# Patient Record
Sex: FEMALE | Race: WHITE | NOT HISPANIC OR LATINO
[De-identification: names, ages, dates, MRNs, and addresses within clinical notes are randomized per-mention and may not be internally consistent; named-entity substitution may affect disease eponyms.]

---

## 2017-08-01 ENCOUNTER — APPOINTMENT (OUTPATIENT)
Dept: MAMMOGRAPHY | Facility: CLINIC | Age: 62
End: 2017-08-01
Payer: MEDICAID

## 2017-08-01 ENCOUNTER — RESULT REVIEW (OUTPATIENT)
Age: 62
End: 2017-08-01

## 2017-08-01 ENCOUNTER — OUTPATIENT (OUTPATIENT)
Dept: OUTPATIENT SERVICES | Facility: HOSPITAL | Age: 62
LOS: 1 days | End: 2017-08-01
Payer: MEDICAID

## 2017-08-01 DIAGNOSIS — Z00.8 ENCOUNTER FOR OTHER GENERAL EXAMINATION: ICD-10-CM

## 2017-08-01 PROCEDURE — 77063 BREAST TOMOSYNTHESIS BI: CPT | Mod: 26

## 2017-08-01 PROCEDURE — 77063 BREAST TOMOSYNTHESIS BI: CPT

## 2017-08-01 PROCEDURE — G0202: CPT | Mod: 26

## 2017-08-01 PROCEDURE — 77067 SCR MAMMO BI INCL CAD: CPT

## 2017-08-07 ENCOUNTER — INPATIENT (INPATIENT)
Facility: HOSPITAL | Age: 62
LOS: 9 days | Discharge: HOME | End: 2017-08-17
Attending: INTERNAL MEDICINE | Admitting: INTERNAL MEDICINE
Payer: MEDICAID

## 2017-08-07 VITALS — WEIGHT: 149.91 LBS | HEIGHT: 63 IN

## 2017-08-07 LAB
ALBUMIN SERPL ELPH-MCNC: 3.3 G/DL — SIGNIFICANT CHANGE UP (ref 3.3–5)
ALP SERPL-CCNC: 83 U/L — SIGNIFICANT CHANGE UP (ref 40–120)
ALT FLD-CCNC: 182 U/L — HIGH (ref 12–78)
ANION GAP SERPL CALC-SCNC: 10 MMOL/L — SIGNIFICANT CHANGE UP (ref 5–17)
APPEARANCE UR: CLEAR — SIGNIFICANT CHANGE UP
APTT BLD: 29.6 SEC — SIGNIFICANT CHANGE UP (ref 27.5–37.4)
AST SERPL-CCNC: 129 U/L — HIGH (ref 15–37)
BACTERIA # UR AUTO: (no result)
BASOPHILS # BLD AUTO: 0.1 K/UL — SIGNIFICANT CHANGE UP (ref 0–0.2)
BASOPHILS NFR BLD AUTO: 0.9 % — SIGNIFICANT CHANGE UP (ref 0–2)
BILIRUB SERPL-MCNC: 1.9 MG/DL — HIGH (ref 0.2–1.2)
BILIRUB UR-MCNC: (no result)
BUN SERPL-MCNC: 10 MG/DL — SIGNIFICANT CHANGE UP (ref 7–23)
CALCIUM SERPL-MCNC: 9.5 MG/DL — SIGNIFICANT CHANGE UP (ref 8.5–10.1)
CHLORIDE SERPL-SCNC: 97 MMOL/L — SIGNIFICANT CHANGE UP (ref 96–108)
CK SERPL-CCNC: 56 U/L — SIGNIFICANT CHANGE UP (ref 26–192)
CO2 SERPL-SCNC: 27 MMOL/L — SIGNIFICANT CHANGE UP (ref 22–31)
COLOR SPEC: (no result)
CREAT SERPL-MCNC: 0.65 MG/DL — SIGNIFICANT CHANGE UP (ref 0.5–1.3)
DIFF PNL FLD: (no result)
EOSINOPHIL # BLD AUTO: 0.2 K/UL — SIGNIFICANT CHANGE UP (ref 0–0.5)
EOSINOPHIL NFR BLD AUTO: 2.5 % — SIGNIFICANT CHANGE UP (ref 0–6)
EPI CELLS # UR: SIGNIFICANT CHANGE UP
GLUCOSE SERPL-MCNC: 135 MG/DL — HIGH (ref 70–99)
GLUCOSE UR QL: NEGATIVE MG/DL — SIGNIFICANT CHANGE UP
HCT VFR BLD CALC: 43.4 % — SIGNIFICANT CHANGE UP (ref 34.5–45)
HGB BLD-MCNC: 14.7 G/DL — SIGNIFICANT CHANGE UP (ref 11.5–15.5)
INR BLD: 1.14 RATIO — SIGNIFICANT CHANGE UP (ref 0.88–1.16)
KETONES UR-MCNC: (no result)
LEUKOCYTE ESTERASE UR-ACNC: (no result)
LIDOCAIN IGE QN: 6621 U/L — HIGH (ref 73–393)
LYMPHOCYTES # BLD AUTO: 1 K/UL — SIGNIFICANT CHANGE UP (ref 1–3.3)
LYMPHOCYTES # BLD AUTO: 17 % — SIGNIFICANT CHANGE UP (ref 13–44)
MCHC RBC-ENTMCNC: 33.3 PG — SIGNIFICANT CHANGE UP (ref 27–34)
MCHC RBC-ENTMCNC: 33.9 GM/DL — SIGNIFICANT CHANGE UP (ref 32–36)
MCV RBC AUTO: 98.2 FL — SIGNIFICANT CHANGE UP (ref 80–100)
MONOCYTES # BLD AUTO: 0.5 K/UL — SIGNIFICANT CHANGE UP (ref 0–0.9)
MONOCYTES NFR BLD AUTO: 8.6 % — SIGNIFICANT CHANGE UP (ref 2–14)
NEUTROPHILS # BLD AUTO: 4.3 K/UL — SIGNIFICANT CHANGE UP (ref 1.8–7.4)
NEUTROPHILS NFR BLD AUTO: 71.1 % — SIGNIFICANT CHANGE UP (ref 43–77)
NITRITE UR-MCNC: NEGATIVE — SIGNIFICANT CHANGE UP
PH UR: 6.5 — SIGNIFICANT CHANGE UP (ref 5–8)
PLATELET # BLD AUTO: 206 K/UL — SIGNIFICANT CHANGE UP (ref 150–400)
POTASSIUM SERPL-MCNC: 3.6 MMOL/L — SIGNIFICANT CHANGE UP (ref 3.5–5.3)
POTASSIUM SERPL-SCNC: 3.6 MMOL/L — SIGNIFICANT CHANGE UP (ref 3.5–5.3)
PROT SERPL-MCNC: 7.8 GM/DL — SIGNIFICANT CHANGE UP (ref 6–8.3)
PROT UR-MCNC: 30 MG/DL
PROTHROM AB SERPL-ACNC: 12.3 SEC — SIGNIFICANT CHANGE UP (ref 9.8–12.7)
RBC # BLD: 4.42 M/UL — SIGNIFICANT CHANGE UP (ref 3.8–5.2)
RBC # FLD: 11.3 % — SIGNIFICANT CHANGE UP (ref 10.3–14.5)
RBC CASTS # UR COMP ASSIST: SIGNIFICANT CHANGE UP /HPF (ref 0–4)
SODIUM SERPL-SCNC: 134 MMOL/L — LOW (ref 135–145)
SP GR SPEC: 1.01 — SIGNIFICANT CHANGE UP (ref 1.01–1.02)
TROPONIN I SERPL-MCNC: <0.015 NG/ML — SIGNIFICANT CHANGE UP (ref 0.01–0.04)
UROBILINOGEN FLD QL: 4 MG/DL
WBC # BLD: 6.1 K/UL — SIGNIFICANT CHANGE UP (ref 3.8–10.5)
WBC # FLD AUTO: 6.1 K/UL — SIGNIFICANT CHANGE UP (ref 3.8–10.5)
WBC UR QL: SIGNIFICANT CHANGE UP

## 2017-08-07 PROCEDURE — 93010 ELECTROCARDIOGRAM REPORT: CPT

## 2017-08-07 PROCEDURE — 74177 CT ABD & PELVIS W/CONTRAST: CPT | Mod: 26

## 2017-08-07 PROCEDURE — 76705 ECHO EXAM OF ABDOMEN: CPT | Mod: 26

## 2017-08-07 PROCEDURE — 71010: CPT | Mod: 26

## 2017-08-07 PROCEDURE — 99285 EMERGENCY DEPT VISIT HI MDM: CPT

## 2017-08-07 RX ORDER — ONDANSETRON 8 MG/1
4 TABLET, FILM COATED ORAL EVERY 6 HOURS
Qty: 0 | Refills: 0 | Status: DISCONTINUED | OUTPATIENT
Start: 2017-08-07 | End: 2017-08-17

## 2017-08-07 RX ORDER — FAMOTIDINE 10 MG/ML
20 INJECTION INTRAVENOUS ONCE
Qty: 0 | Refills: 0 | Status: COMPLETED | OUTPATIENT
Start: 2017-08-07 | End: 2017-08-07

## 2017-08-07 RX ORDER — HYDROMORPHONE HYDROCHLORIDE 2 MG/ML
1 INJECTION INTRAMUSCULAR; INTRAVENOUS; SUBCUTANEOUS EVERY 4 HOURS
Qty: 0 | Refills: 0 | Status: DISCONTINUED | OUTPATIENT
Start: 2017-08-07 | End: 2017-08-14

## 2017-08-07 RX ORDER — SODIUM CHLORIDE 9 MG/ML
1000 INJECTION INTRAMUSCULAR; INTRAVENOUS; SUBCUTANEOUS ONCE
Qty: 0 | Refills: 0 | Status: COMPLETED | OUTPATIENT
Start: 2017-08-07 | End: 2017-08-07

## 2017-08-07 RX ORDER — SODIUM CHLORIDE 9 MG/ML
1000 INJECTION INTRAMUSCULAR; INTRAVENOUS; SUBCUTANEOUS
Qty: 0 | Refills: 0 | Status: DISCONTINUED | OUTPATIENT
Start: 2017-08-07 | End: 2017-08-11

## 2017-08-07 RX ORDER — SODIUM CHLORIDE 9 MG/ML
1000 INJECTION, SOLUTION INTRAVENOUS
Qty: 0 | Refills: 0 | Status: DISCONTINUED | OUTPATIENT
Start: 2017-08-07 | End: 2017-08-10

## 2017-08-07 RX ORDER — PANTOPRAZOLE SODIUM 20 MG/1
40 TABLET, DELAYED RELEASE ORAL DAILY
Qty: 0 | Refills: 0 | Status: DISCONTINUED | OUTPATIENT
Start: 2017-08-07 | End: 2017-08-16

## 2017-08-07 RX ORDER — MAGNESIUM SULFATE 500 MG/ML
1 VIAL (ML) INJECTION ONCE
Qty: 0 | Refills: 0 | Status: COMPLETED | OUTPATIENT
Start: 2017-08-07 | End: 2017-08-07

## 2017-08-07 RX ORDER — HYDROMORPHONE HYDROCHLORIDE 2 MG/ML
0.5 INJECTION INTRAMUSCULAR; INTRAVENOUS; SUBCUTANEOUS EVERY 4 HOURS
Qty: 0 | Refills: 0 | Status: DISCONTINUED | OUTPATIENT
Start: 2017-08-07 | End: 2017-08-07

## 2017-08-07 RX ORDER — ONDANSETRON 8 MG/1
4 TABLET, FILM COATED ORAL ONCE
Qty: 0 | Refills: 0 | Status: COMPLETED | OUTPATIENT
Start: 2017-08-07 | End: 2017-08-07

## 2017-08-07 RX ORDER — SODIUM CHLORIDE 9 MG/ML
3 INJECTION INTRAMUSCULAR; INTRAVENOUS; SUBCUTANEOUS ONCE
Qty: 0 | Refills: 0 | Status: COMPLETED | OUTPATIENT
Start: 2017-08-07 | End: 2017-08-07

## 2017-08-07 RX ORDER — HYDROMORPHONE HYDROCHLORIDE 2 MG/ML
1 INJECTION INTRAMUSCULAR; INTRAVENOUS; SUBCUTANEOUS ONCE
Qty: 0 | Refills: 0 | Status: DISCONTINUED | OUTPATIENT
Start: 2017-08-07 | End: 2017-08-07

## 2017-08-07 RX ORDER — ENOXAPARIN SODIUM 100 MG/ML
40 INJECTION SUBCUTANEOUS EVERY 24 HOURS
Qty: 0 | Refills: 0 | Status: DISCONTINUED | OUTPATIENT
Start: 2017-08-07 | End: 2017-08-17

## 2017-08-07 RX ADMIN — HYDROMORPHONE HYDROCHLORIDE 1 MILLIGRAM(S): 2 INJECTION INTRAMUSCULAR; INTRAVENOUS; SUBCUTANEOUS at 11:03

## 2017-08-07 RX ADMIN — Medication 100 GRAM(S): at 21:21

## 2017-08-07 RX ADMIN — SODIUM CHLORIDE 1000 MILLILITER(S): 9 INJECTION INTRAMUSCULAR; INTRAVENOUS; SUBCUTANEOUS at 08:45

## 2017-08-07 RX ADMIN — ENOXAPARIN SODIUM 40 MILLIGRAM(S): 100 INJECTION SUBCUTANEOUS at 21:25

## 2017-08-07 RX ADMIN — HYDROMORPHONE HYDROCHLORIDE 1 MILLIGRAM(S): 2 INJECTION INTRAMUSCULAR; INTRAVENOUS; SUBCUTANEOUS at 11:30

## 2017-08-07 RX ADMIN — Medication 1 MILLIGRAM(S): at 15:00

## 2017-08-07 RX ADMIN — SODIUM CHLORIDE 3 MILLILITER(S): 9 INJECTION INTRAMUSCULAR; INTRAVENOUS; SUBCUTANEOUS at 08:45

## 2017-08-07 RX ADMIN — FAMOTIDINE 20 MILLIGRAM(S): 10 INJECTION INTRAVENOUS at 08:46

## 2017-08-07 RX ADMIN — ONDANSETRON 4 MILLIGRAM(S): 8 TABLET, FILM COATED ORAL at 09:30

## 2017-08-07 RX ADMIN — HYDROMORPHONE HYDROCHLORIDE 1 MILLIGRAM(S): 2 INJECTION INTRAMUSCULAR; INTRAVENOUS; SUBCUTANEOUS at 15:22

## 2017-08-07 RX ADMIN — HYDROMORPHONE HYDROCHLORIDE 1 MILLIGRAM(S): 2 INJECTION INTRAMUSCULAR; INTRAVENOUS; SUBCUTANEOUS at 19:57

## 2017-08-07 RX ADMIN — SODIUM CHLORIDE 125 MILLILITER(S): 9 INJECTION, SOLUTION INTRAVENOUS at 15:22

## 2017-08-07 RX ADMIN — PANTOPRAZOLE SODIUM 40 MILLIGRAM(S): 20 TABLET, DELAYED RELEASE ORAL at 21:21

## 2017-08-07 NOTE — H&P ADULT - HISTORY OF PRESENT ILLNESS
62F with Hx of PUD, Etoh dependence and Pancreatitis in the past p/w abd pain x5 days, worse this weekend. pt states abd pain is mostly in the upper abd, worse in the left side. denies any vomiting, diarrhea. (+) nausea. no fever, chills, dysuria. pain much worse this weekend. pt usually drinks 2-3 vodka drinks daily, last weekend had more to drink however. last drink saturday night. denies any hx of seizure in the past, no hx of detox prior.

## 2017-08-07 NOTE — ED PROVIDER NOTE - MEDICAL DECISION MAKING DETAILS
61 yo WF, h/o PUD & pancreatitis, w/ upper abd. pain which became more generalized.  + Focal RUQ/epigastric tender.  Plan: NPO, IVF, IV Pepcid, labs, RUQ abd. sono, EKG, CXR.  Observe, reassess, medicate as needed. 61 yo WF, h/o PUD & pancreatitis, w/ upper abd. pain which became more generalized.  + Focal RUQ/epigastric tender.  Plan: NPO, IVF, IV Pepcid, labs, RUQ abd. sono, EKG, CXR.  Observe, reassess, medicate as needed.  Lipase 6600, sono no arsen., + mildly prominent panc. duct.  CT A/P ordered, pt TBA.

## 2017-08-07 NOTE — H&P ADULT - ASSESSMENT
62F with Etoh induced pancreatitis      *Pancreatitis:  -admit to medicine  -npo, ivf  -gi eval  -trend LRTs, monitor for dilutional effects  -pain-control, anti-emetics    *Etoh Dependence and withdrawal:  -currently mild w/d, CIWA 4  -ativan prn on low risk w/d protocol  -etoh cessation   -IVF hydration  -sz px      *Hx PUD:  -PPI    *DVT px:  -LMWH

## 2017-08-07 NOTE — ED ADULT NURSE NOTE - CHPI ED SYMPTOMS NEG
no blood in stool/no burning urination/no fever/no dysuria/no chills/no vomiting/no diarrhea/no nausea/no hematuria

## 2017-08-07 NOTE — ED PROVIDER NOTE - OBJECTIVE STATEMENT
Exam begun at 08:25.  61 yo WF, PMH pancreatitis ~ 10 yrs. ago, PUD, ambulatory to ED w/  c/o'ing abdominal pain.  Somewhat acute onset 2 dd. ago: mild, but + worsened overnight/ this AM to presently severe.  Pain is dull ache, nonradiating, initially epigastric but which has become more diffuse, + exacerbated by po attempt but not by movement.  + Associated loss appetite, thania[phoresis.  No F/C, urine c/o's, N/V/D, cp, SOB, cough, rash.  Meds: none.  NKDA.  PCP: [facility]

## 2017-08-07 NOTE — ED ADULT NURSE NOTE - OBJECTIVE STATEMENT
Pt presents to ED c/o abd pain that started in upper abdomen but is now diffuse in all 4 quadrants. Pt reports that drinking or eating anything worsens symptoms. Pt reports hx of ulcers and hx of pancreatitis 20 years ago

## 2017-08-07 NOTE — H&P ADULT - NSHPLABSRESULTS_GEN_ALL_CORE
14.7   6.1   )-----------( 206      ( 07 Aug 2017 08:26 )             43.4         134<L>  |  97  |  10  ----------------------------<  135<H>  3.6   |  27  |  0.65    Ca    9.5      07 Aug 2017 08:26    TPro  7.8  /  Alb  3.3  /  TBili  1.9<H>  /  DBili  x   /  AST  129<H>  /  ALT  182<H>  /  AlkPhos  83      CARDIAC MARKERS ( 07 Aug 2017 08:26 )  <0.015 ng/mL / x     / 56 U/L / x     / x          LIVER FUNCTIONS - ( 07 Aug 2017 08:26 )  Alb: 3.3 g/dL / Pro: 7.8 gm/dL / ALK PHOS: 83 U/L / ALT: 182 U/L / AST: 129 U/L / GGT: x           PT/INR - ( 07 Aug 2017 08:26 )   PT: 12.3 sec;   INR: 1.14 ratio         PTT - ( 07 Aug 2017 08:26 )  PTT:29.6 sec  Urinalysis Basic - ( 07 Aug 2017 10:30 )    Color: Jessi / Appearance: Clear / S.010 / pH: x  Gluc: x / Ketone: Large  / Bili: Moderate / Urobili: 4 mg/dL   Blood: x / Protein: 30 mg/dL / Nitrite: Negative   Leuk Esterase: Trace / RBC: 0-2 /HPF / WBC 0-2   Sq Epi: x / Non Sq Epi: Few / Bacteria: Few          Blood, Urine: Trace ( @ 10:30)

## 2017-08-07 NOTE — ED ADULT TRIAGE NOTE - CHIEF COMPLAINT QUOTE
c/o abdominal pain to all four quadrants started yesterday, worse today, denies nausea/vomiting/diarrhea.

## 2017-08-07 NOTE — CONSULT NOTE ADULT - ASSESSMENT
63 yo woman admitted with pancreatitis    -NPO  -IVF  -Pain management  -Elevated liver tests due to ETOH hepatitis. No indication for steroids  -Monitor LFTs, UOP, Hct, BUN

## 2017-08-07 NOTE — CONSULT NOTE ADULT - SUBJECTIVE AND OBJECTIVE BOX
HPI:  61 yo woman was admitted with acute pancreatitis. Drinks heavily. Had an episode of pancreatitis 23 years ago. No n/v. No fever. +epigastric pain.       PAST MEDICAL & SURGICAL HISTORY:  Alcohol dependence  Pancreatitis  PUD (peptic ulcer disease)  No significant past surgical history      MEDICATIONS  (STANDING):  enoxaparin Injectable 40 milliGRAM(s) SubCutaneous every 24 hours  sodium chloride 0.9%. 1000 milliLiter(s) (125 mL/Hr) IV Continuous <Continuous>  sodium chloride 0.9% 1000 milliLiter(s) (125 mL/Hr) IV Continuous <Continuous>  magnesium sulfate  IVPB 1 Gram(s) IV Intermittent once  pantoprazole  Injectable 40 milliGRAM(s) IV Push daily    MEDICATIONS  (PRN):  ondansetron Injectable 4 milliGRAM(s) IV Push every 6 hours PRN Nausea  HYDROmorphone  Injectable 0.5 milliGRAM(s) IV Push every 4 hours PRN Moderate Pain (4 - 6)  HYDROmorphone  Injectable 1 milliGRAM(s) IV Push every 4 hours PRN Severe Pain (7 - 10)  LORazepam     Tablet 2 milliGRAM(s) Oral every 2 hours PRN CIWA-Ar score increase by 2 points and a total score of 7 or less      Allergies    No Known Allergies    Intolerances        SOCIAL HISTORY:  No smoking, social alcohol use, no recreational drug use    FAMILY HISTORY:      REVIEW OF SYSTEMS    General: no fever    HEENT: no icterus    Respiratory and Thorax: no SOB  	  Cardiovascular: no CP    Gastrointestinal: as above    : no dysuria    Musculoskeletal: no myalgias    Skin: no jaundice    Neuro: no headaches or dizziness    Vital Signs Last 24 Hrs  T(C): 36.8 (07 Aug 2017 15:00), Max: 36.9 (07 Aug 2017 11:02)  T(F): 98.2 (07 Aug 2017 15:00), Max: 98.5 (07 Aug 2017 11:02)  HR: 82 (07 Aug 2017 15:00) (82 - 105)  BP: 137/82 (07 Aug 2017 15:00) (137/82 - 161/93)  BP(mean): --  RR: 16 (07 Aug 2017 15:00) (16 - 18)  SpO2: 100% (07 Aug 2017 15:00) (98% - 100%)    PHYSICAL EXAM:    Constitutional: NAD    Head: NCAT    HEENT: anicteric    Neck: no abnormal lymphadenopathy    Respiratory: CTA BL    Cardiovascular:  RRR    Gastrointestinal: soft ND +BS +epigastric tenderness    Extremities: no LE edema    Neuro: no focal deficits    Skin: no jaundice      LABS:                        14.7   6.1   )-----------( 206      ( 07 Aug 2017 08:26 )             43.4     08-07    134<L>  |  97  |  10  ----------------------------<  135<H>  3.6   |  27  |  0.65    Ca    9.5      07 Aug 2017 08:26  Phos  3.3     08-07  Mg     1.5     08-07    TPro  7.8  /  Alb  3.3  /  TBili  1.9<H>  /  DBili  x   /  AST  129<H>  /  ALT  182<H>  /  AlkPhos  83  08-07    PT/INR - ( 07 Aug 2017 08:26 )   PT: 12.3 sec;   INR: 1.14 ratio         PTT - ( 07 Aug 2017 08:26 )  PTT:29.6 sec  LIVER FUNCTIONS - ( 07 Aug 2017 08:26 )  Alb: 3.3 g/dL / Pro: 7.8 gm/dL / ALK PHOS: 83 U/L / ALT: 182 U/L / AST: 129 U/L / GGT: x             RADIOLOGY & ADDITIONAL STUDIES:

## 2017-08-07 NOTE — ED ADULT NURSE REASSESSMENT NOTE - NS ED NURSE REASSESS COMMENT FT1
Pt medicated as order for pain, reports instant partial relief. Pt drinking oral contrast for CT scan. Pt's  at bedside, LUIS MIGUEL

## 2017-08-07 NOTE — ED PROVIDER NOTE - GASTROINTESTINAL, MLM
Abdomen soft, BS hypoactive, normal pitch.  + Slight diffuse tenderness, + moderate TTP RUQ > epigastric w/ + clinical Olmos's sign.

## 2017-08-07 NOTE — H&P ADULT - NSHPPHYSICALEXAM_GEN_ALL_CORE
PHYSICAL EXAM:    Constitutional: NAD, awake and alert, well-developed  HEENT: PERR, EOMI, Normal Hearing, MMM  Neck: Soft and supple, No LAD, No JVD  Respiratory: Breath sounds are clear bilaterally, No wheezing, rales or rhonchi  Cardiovascular: S1 and S2, regular rate and rhythm, no Murmurs, gallops or rubs  Gastrointestinal: Bowel Sounds present, soft, tender in upper abd, L>R, nondistended, no guarding, no rebound  Extremities: No peripheral edema  Vascular: 2+ peripheral pulses  Neurological: A/O x 3, no focal deficits  Musculoskeletal: 5/5 strength b/l upper and lower extremities. No CVA tenderness.  Skin: No rashes

## 2017-08-07 NOTE — ED PROVIDER NOTE - CONSTITUTIONAL, MLM
normal... WF, awake, alert, oriented to person, place, time/situation, mildly ill-appearing, moderate distress due to pain.

## 2017-08-07 NOTE — SBIRT NOTE. - NSSBIRTFULLSCREEN_GEN_A_ED_FT
Meeting with patient attempted however Full Screen Not Performed due to    Severity of illness; patient expressed pain to this author, health  communicated the information to the RN; health  will return to the bedside at 10am

## 2017-08-08 LAB
MAGNESIUM SERPL-MCNC: 1.8 MG/DL — SIGNIFICANT CHANGE UP (ref 1.6–2.6)
PHOSPHATE SERPL-MCNC: 2.1 MG/DL — LOW (ref 2.5–4.5)

## 2017-08-08 RX ORDER — POTASSIUM CHLORIDE 20 MEQ
20 PACKET (EA) ORAL ONCE
Qty: 0 | Refills: 0 | Status: DISCONTINUED | OUTPATIENT
Start: 2017-08-08 | End: 2017-08-08

## 2017-08-08 RX ORDER — POTASSIUM CHLORIDE 20 MEQ
10 PACKET (EA) ORAL
Qty: 0 | Refills: 0 | Status: COMPLETED | OUTPATIENT
Start: 2017-08-08 | End: 2017-08-08

## 2017-08-08 RX ADMIN — HYDROMORPHONE HYDROCHLORIDE 1 MILLIGRAM(S): 2 INJECTION INTRAMUSCULAR; INTRAVENOUS; SUBCUTANEOUS at 09:01

## 2017-08-08 RX ADMIN — HYDROMORPHONE HYDROCHLORIDE 1 MILLIGRAM(S): 2 INJECTION INTRAMUSCULAR; INTRAVENOUS; SUBCUTANEOUS at 00:13

## 2017-08-08 RX ADMIN — HYDROMORPHONE HYDROCHLORIDE 1 MILLIGRAM(S): 2 INJECTION INTRAMUSCULAR; INTRAVENOUS; SUBCUTANEOUS at 04:46

## 2017-08-08 RX ADMIN — HYDROMORPHONE HYDROCHLORIDE 1 MILLIGRAM(S): 2 INJECTION INTRAMUSCULAR; INTRAVENOUS; SUBCUTANEOUS at 14:36

## 2017-08-08 RX ADMIN — SODIUM CHLORIDE 125 MILLILITER(S): 9 INJECTION, SOLUTION INTRAVENOUS at 14:37

## 2017-08-08 RX ADMIN — SODIUM CHLORIDE 125 MILLILITER(S): 9 INJECTION INTRAMUSCULAR; INTRAVENOUS; SUBCUTANEOUS at 09:09

## 2017-08-08 RX ADMIN — SODIUM CHLORIDE 125 MILLILITER(S): 9 INJECTION INTRAMUSCULAR; INTRAVENOUS; SUBCUTANEOUS at 00:55

## 2017-08-08 RX ADMIN — ENOXAPARIN SODIUM 40 MILLIGRAM(S): 100 INJECTION SUBCUTANEOUS at 20:53

## 2017-08-08 RX ADMIN — PANTOPRAZOLE SODIUM 40 MILLIGRAM(S): 20 TABLET, DELAYED RELEASE ORAL at 11:15

## 2017-08-08 RX ADMIN — Medication 100 MILLIEQUIVALENT(S): at 11:15

## 2017-08-08 RX ADMIN — HYDROMORPHONE HYDROCHLORIDE 1 MILLIGRAM(S): 2 INJECTION INTRAMUSCULAR; INTRAVENOUS; SUBCUTANEOUS at 23:00

## 2017-08-08 RX ADMIN — Medication 100 MILLIEQUIVALENT(S): at 12:49

## 2017-08-08 RX ADMIN — HYDROMORPHONE HYDROCHLORIDE 1 MILLIGRAM(S): 2 INJECTION INTRAMUSCULAR; INTRAVENOUS; SUBCUTANEOUS at 18:56

## 2017-08-08 NOTE — PROGRESS NOTE ADULT - SUBJECTIVE AND OBJECTIVE BOX
HPI:  61 yo woman was admitted with acute pancreatitis. Drinks heavily. Had an episode of pancreatitis 23 years ago. No n/v. No fever. +epigastric pain.       PAST MEDICAL & SURGICAL HISTORY:  Alcohol dependence  Pancreatitis  PUD (peptic ulcer disease)  No significant past surgical history    Vital Signs Last 24 Hrs  - noted    Constitutional: NAD    Head: NCAT    HEENT: anicteric    Neck: no abnormal lymphadenopathy    Respiratory: CTA BL    Cardiovascular:  RRR    Gastrointestinal: soft ND +BS +epigastric tenderness    Extremities: no LE edema    Neuro: no focal deficits    Skin: no jaundice      LABS: reviewed                  A/P:     61 yo woman admitted with pancreatitis    - lipase trending downdown  -IVF  -Pain management  -Elevated liver tests due to ETOH hepatitis. No indication for steroids  - outpt f/up pancreatic cyst

## 2017-08-08 NOTE — PROGRESS NOTE ADULT - SUBJECTIVE AND OBJECTIVE BOX
HPI:  61 yo woman was admitted with acute pancreatitis. Drinks heavily. Had an episode of pancreatitis 23 years ago. No n/v. No fever. +epigastric pain but improving.     MEDICATIONS  (STANDING):  enoxaparin Injectable 40 milliGRAM(s) SubCutaneous every 24 hours  sodium chloride 0.9%. 1000 milliLiter(s) (125 mL/Hr) IV Continuous <Continuous>  sodium chloride 0.9% 1000 milliLiter(s) (125 mL/Hr) IV Continuous <Continuous>  pantoprazole  Injectable 40 milliGRAM(s) IV Push daily  potassium chloride  20 mEq/100 mL IVPB 20 milliEquivalent(s) IV Intermittent once    MEDICATIONS  (PRN):  ondansetron Injectable 4 milliGRAM(s) IV Push every 6 hours PRN Nausea  HYDROmorphone  Injectable 0.5 milliGRAM(s) IV Push every 4 hours PRN Moderate Pain (4 - 6)  HYDROmorphone  Injectable 1 milliGRAM(s) IV Push every 4 hours PRN Severe Pain (7 - 10)  LORazepam     Tablet 2 milliGRAM(s) Oral every 2 hours PRN CIWA-Ar score increase by 2 points and a total score of 7 or less      Allergies    No Known Allergies    Intolerances        REVIEW OF SYSTEMS    General: no fever    HEENT: no icterus    Respiratory and Thorax: no SOB  	  Cardiovascular: no CP    Gastrointestinal: as above    Skin: no jaundice      Vital Signs Last 24 Hrs  T(C): 37.8 (08 Aug 2017 09:00), Max: 37.8 (08 Aug 2017 09:00)  T(F): 100 (08 Aug 2017 09:00), Max: 100 (08 Aug 2017 09:00)  HR: 98 (08 Aug 2017 09:00) (82 - 111)  BP: 126/81 (08 Aug 2017 04:05) (120/78 - 161/93)  BP(mean): --  RR: 17 (08 Aug 2017 09:00) (16 - 18)  SpO2: 95% (08 Aug 2017 09:00) (93% - 100%)    PHYSICAL EXAM:    Constitutional: NAD    HEENT: anicteric    Respiratory: CTA BL    Cardiovascular:  RRR    Gastrointestinal: soft ND +BS mild epigastric tenderness    Extremities: no LE edema    Neuro: no focal deficits    Skin: no jaundice      LABS:                        12.0   5.9   )-----------( 155      ( 08 Aug 2017 05:59 )             34.7     08-08    135  |  102  |  10  ----------------------------<  88  3.1<L>   |  24  |  0.42<L>    Ca    7.7<L>      08 Aug 2017 05:59  Phos  2.1     08-08  Mg     1.8     08-08    TPro  5.9<L>  /  Alb  2.5<L>  /  TBili  2.0<H>  /  DBili  x   /  AST  66<H>  /  ALT  97<H>  /  AlkPhos  60  08-08    PT/INR - ( 07 Aug 2017 08:26 )   PT: 12.3 sec;   INR: 1.14 ratio         PTT - ( 07 Aug 2017 08:26 )  PTT:29.6 sec  LIVER FUNCTIONS - ( 08 Aug 2017 05:59 )  Alb: 2.5 g/dL / Pro: 5.9 gm/dL / ALK PHOS: 60 U/L / ALT: 97 U/L / AST: 66 U/L / GGT: x             RADIOLOGY & ADDITIONAL STUDIES:

## 2017-08-08 NOTE — PROGRESS NOTE ADULT - ASSESSMENT
63 yo woman admitted with pancreatitis    -Clear liquids  -Good response to IVF: can decrease rate if tolerating adequate amounts of clears  -Pain management: wean  -Elevated liver tests due to ETOH hepatitis. No indication for steroids  -Monitor LFTs, UOP, Hct, BUN

## 2017-08-09 LAB
ANION GAP SERPL CALC-SCNC: 10 MMOL/L — SIGNIFICANT CHANGE UP (ref 5–17)
BUN SERPL-MCNC: 6 MG/DL — LOW (ref 7–23)
CALCIUM SERPL-MCNC: 7.4 MG/DL — LOW (ref 8.5–10.1)
CHLORIDE SERPL-SCNC: 100 MMOL/L — SIGNIFICANT CHANGE UP (ref 96–108)
CO2 SERPL-SCNC: 25 MMOL/L — SIGNIFICANT CHANGE UP (ref 22–31)
CREAT SERPL-MCNC: 0.46 MG/DL — LOW (ref 0.5–1.3)
GLUCOSE SERPL-MCNC: 83 MG/DL — SIGNIFICANT CHANGE UP (ref 70–99)
LIDOCAIN IGE QN: 591 U/L — HIGH (ref 73–393)
MAGNESIUM SERPL-MCNC: 1.9 MG/DL — SIGNIFICANT CHANGE UP (ref 1.6–2.6)
PHOSPHATE SERPL-MCNC: 1.6 MG/DL — LOW (ref 2.5–4.5)
POTASSIUM SERPL-MCNC: 3.2 MMOL/L — LOW (ref 3.5–5.3)
POTASSIUM SERPL-SCNC: 3.2 MMOL/L — LOW (ref 3.5–5.3)
SODIUM SERPL-SCNC: 135 MMOL/L — SIGNIFICANT CHANGE UP (ref 135–145)

## 2017-08-09 PROCEDURE — 74177 CT ABD & PELVIS W/CONTRAST: CPT | Mod: 26

## 2017-08-09 RX ORDER — ACETAMINOPHEN 500 MG
650 TABLET ORAL EVERY 6 HOURS
Qty: 0 | Refills: 0 | Status: DISCONTINUED | OUTPATIENT
Start: 2017-08-09 | End: 2017-08-17

## 2017-08-09 RX ORDER — POTASSIUM CHLORIDE 20 MEQ
40 PACKET (EA) ORAL ONCE
Qty: 0 | Refills: 0 | Status: COMPLETED | OUTPATIENT
Start: 2017-08-09 | End: 2017-08-09

## 2017-08-09 RX ORDER — ACETAMINOPHEN 500 MG
650 TABLET ORAL ONCE
Qty: 0 | Refills: 0 | Status: COMPLETED | OUTPATIENT
Start: 2017-08-09 | End: 2017-08-09

## 2017-08-09 RX ADMIN — Medication 650 MILLIGRAM(S): at 13:53

## 2017-08-09 RX ADMIN — HYDROMORPHONE HYDROCHLORIDE 1 MILLIGRAM(S): 2 INJECTION INTRAMUSCULAR; INTRAVENOUS; SUBCUTANEOUS at 23:36

## 2017-08-09 RX ADMIN — PANTOPRAZOLE SODIUM 40 MILLIGRAM(S): 20 TABLET, DELAYED RELEASE ORAL at 11:27

## 2017-08-09 RX ADMIN — HYDROMORPHONE HYDROCHLORIDE 1 MILLIGRAM(S): 2 INJECTION INTRAMUSCULAR; INTRAVENOUS; SUBCUTANEOUS at 13:07

## 2017-08-09 RX ADMIN — SODIUM CHLORIDE 125 MILLILITER(S): 9 INJECTION INTRAMUSCULAR; INTRAVENOUS; SUBCUTANEOUS at 01:27

## 2017-08-09 RX ADMIN — HYDROMORPHONE HYDROCHLORIDE 1 MILLIGRAM(S): 2 INJECTION INTRAMUSCULAR; INTRAVENOUS; SUBCUTANEOUS at 07:48

## 2017-08-09 RX ADMIN — Medication 40 MILLIEQUIVALENT(S): at 11:27

## 2017-08-09 RX ADMIN — SODIUM CHLORIDE 125 MILLILITER(S): 9 INJECTION INTRAMUSCULAR; INTRAVENOUS; SUBCUTANEOUS at 21:01

## 2017-08-09 RX ADMIN — ENOXAPARIN SODIUM 40 MILLIGRAM(S): 100 INJECTION SUBCUTANEOUS at 21:01

## 2017-08-09 RX ADMIN — SODIUM CHLORIDE 125 MILLILITER(S): 9 INJECTION, SOLUTION INTRAVENOUS at 07:48

## 2017-08-09 NOTE — PROGRESS NOTE ADULT - SUBJECTIVE AND OBJECTIVE BOX
Patient is a 62y old  Female who presents with a chief complaint of Abd pain (07 Aug 2017 14:42)      HPI:  62F with Hx of PUD, Etoh dependence and Pancreatitis in the past p/w abd pain x5 days, worse this weekend. pt states abd pain is mostly in the upper abd, worse in the left side. denies any vomiting, diarrhea. (+) nausea. no fever, chills, dysuria. pain much worse this weekend. pt usually drinks 2-3 vodka drinks daily, last weekend had more to drink however. last drink saturday night. denies any hx of seizure in the past, no hx of detox prior. (07 Aug 2017 14:42).    Patient was feeling, better, but awoke this AM with increased pain. Tolerating liquids. No nausea.      PAST MEDICAL & SURGICAL HISTORY:  Alcohol dependence  Pancreatitis  PUD (peptic ulcer disease)  No significant past surgical history      MEDICATIONS  (STANDING):  enoxaparin Injectable 40 milliGRAM(s) SubCutaneous every 24 hours  sodium chloride 0.9%. 1000 milliLiter(s) (125 mL/Hr) IV Continuous <Continuous>  sodium chloride 0.9% 1000 milliLiter(s) (125 mL/Hr) IV Continuous <Continuous>  pantoprazole  Injectable 40 milliGRAM(s) IV Push daily  potassium chloride    Tablet ER 40 milliEquivalent(s) Oral once    MEDICATIONS  (PRN):  ondansetron Injectable 4 milliGRAM(s) IV Push every 6 hours PRN Nausea  HYDROmorphone  Injectable 0.5 milliGRAM(s) IV Push every 4 hours PRN Moderate Pain (4 - 6)  HYDROmorphone  Injectable 1 milliGRAM(s) IV Push every 4 hours PRN Severe Pain (7 - 10)  LORazepam     Tablet 2 milliGRAM(s) Oral every 2 hours PRN CIWA-Ar score increase by 2 points and a total score of 7 or less      Allergies    No Known Allergies    Intolerances        REVIEW OF SYSTEMS:    CONSTITUTIONAL: No weakness, fevers or chills  RESPIRATORY: No cough, wheezing, hemoptysis; No shortness of breath  CARDIOVASCULAR: No chest pain or palpitations  GASTROINTESTINAL: No abdominal or epigastric pain. No nausea, vomiting, or hematemesis; No diarrhea or constipation. No melena or hematochezia.  All other review of systems is negative unless indicated above.    Vital Signs Last 24 Hrs  T(C): 37.7 (09 Aug 2017 05:23), Max: 37.7 (09 Aug 2017 05:23)  T(F): 99.8 (09 Aug 2017 05:23), Max: 99.8 (09 Aug 2017 05:23)  HR: 95 (09 Aug 2017 05:23) (95 - 104)  BP: 112/65 (09 Aug 2017 05:23) (109/67 - 124/58)  BP(mean): --  RR: 16 (09 Aug 2017 01:22) (16 - 18)  SpO2: 95% (09 Aug 2017 05:23) (93% - 95%)    PHYSICAL EXAM:    Constitutional: NAD, well-developed  Respiratory: CTAB  Cardiovascular: S1 and S2, RRR, no M/G/R  Gastrointestinal: BS+, soft, mild epigastric tenderness      LABS:                        12.0   5.9   )-----------( 155      ( 08 Aug 2017 05:59 )             34.7     08-09    135  |  100  |  6<L>  ----------------------------<  83  3.2<L>   |  25  |  0.46<L>    Ca    7.4<L>      09 Aug 2017 07:14  Phos  1.6     08-09  Mg     1.9     08-09    TPro  5.9<L>  /  Alb  2.5<L>  /  TBili  2.0<H>  /  DBili  x   /  AST  66<H>  /  ALT  97<H>  /  AlkPhos  60  08-08      LIVER FUNCTIONS - ( 08 Aug 2017 05:59 )  Alb: 2.5 g/dL / Pro: 5.9 gm/dL / ALK PHOS: 60 U/L / ALT: 97 U/L / AST: 66 U/L / GGT: x             RADIOLOGY & ADDITIONAL STUDIES:

## 2017-08-09 NOTE — PROGRESS NOTE ADULT - SUBJECTIVE AND OBJECTIVE BOX
HPI:  63 yo woman was admitted with acute pancreatitis. Drinks heavily. Had an episode of pancreatitis 23 years ago. No n/v. No fever. +epigastric pain.       PAST MEDICAL & SURGICAL HISTORY:  Alcohol dependence  Pancreatitis  PUD (peptic ulcer disease)  No significant past surgical history    Vital Signs Last 24 Hrs  T(C): 37.7 (09 Aug 2017 05:23), Max: 37.8 (08 Aug 2017 09:00)  T(F): 99.8 (09 Aug 2017 05:23), Max: 100 (08 Aug 2017 09:00)  HR: 95 (09 Aug 2017 05:23) (95 - 104)  BP: 112/65 (09 Aug 2017 05:23) (109/67 - 124/58)  BP(mean): --  RR: 16 (09 Aug 2017 01:22) (16 - 18)  SpO2: 95% (09 Aug 2017 05:23) (93% - 95%)  PHYSICAL EXAM:    Constitutional: NAD    Head: NCAT    HEENT: anicteric    Neck: no abnormal lymphadenopathy    Respiratory: CTA BL    Cardiovascular:  RRR    Gastrointestinal: soft ND +BS +epigastric tenderness    Extremities: no LE edema    Neuro: no focal deficits    Skin: no jaundice      LABS: reviewed                  A/P:     63 yo woman admitted with pancreatitis    -NPO  -IVF  -Pain management  -Elevated liver tests due to ETOH hepatitis. No indication for steroids  -Monitor LFTs, UOP, Hct, BUN  - outpt f/up pancreatic cyst HPI:  61 yo woman was admitted with acute pancreatitis. Drinks heavily. Had an episode of pancreatitis 23 years ago. No n/v. No fever. +epigastric pain.       PAST MEDICAL & SURGICAL HISTORY:  Alcohol dependence  Pancreatitis  PUD (peptic ulcer disease)  No significant past surgical history    Vital Signs Last 24 Hrs  T(C): 37.7 (09 Aug 2017 05:23), Max: 37.8 (08 Aug 2017 09:00)  T(F): 99.8 (09 Aug 2017 05:23), Max: 100 (08 Aug 2017 09:00)  HR: 95 (09 Aug 2017 05:23) (95 - 104)  BP: 112/65 (09 Aug 2017 05:23) (109/67 - 124/58)  BP(mean): --  RR: 16 (09 Aug 2017 01:22) (16 - 18)  SpO2: 95% (09 Aug 2017 05:23) (93% - 95%)  PHYSICAL EXAM:    Constitutional: NAD    Head: NCAT    HEENT: anicteric    Neck: no abnormal lymphadenopathy    Respiratory: CTA BL    Cardiovascular:  RRR    Gastrointestinal: soft ND +BS +epigastric tenderness    Extremities: no LE edema    Neuro: no focal deficits    Skin: no jaundice      LABS: reviewed                  A/P:     61 yo woman admitted with pancreatitis    - lipase down to 300  -IVF  -Pain management  -Elevated liver tests due to ETOH hepatitis. No indication for steroids  - outpt f/up pancreatic cyst HPI:  61 yo woman was admitted with acute pancreatitis. Drinks heavily. Had an episode of pancreatitis 23 years ago. No n/v. No fever. +epigastric pain. Tm 100.6; woke up in pain; abd is silent today      PAST MEDICAL & SURGICAL HISTORY:  Alcohol dependence  Pancreatitis  PUD (peptic ulcer disease)  No significant past surgical history    Vital Signs Last 24 Hrs  T(C): 37.7 (09 Aug 2017 05:23), Max: 37.8 (08 Aug 2017 09:00)  T(F): 99.8 (09 Aug 2017 05:23), Max: 100 (08 Aug 2017 09:00)  HR: 95 (09 Aug 2017 05:23) (95 - 104)  BP: 112/65 (09 Aug 2017 05:23) (109/67 - 124/58)  BP(mean): --  RR: 16 (09 Aug 2017 01:22) (16 - 18)  SpO2: 95% (09 Aug 2017 05:23) (93% - 95%)  PHYSICAL EXAM:    Constitutional: NAD    Head: NCAT    HEENT: anicteric    Neck: no abnormal lymphadenopathy    Respiratory: CTA BL    Cardiovascular:  RRR    Gastrointestinal: more distended, decreased BS +epigastric tenderness    Extremities: no LE edema    Neuro: no focal deficits    Skin: no jaundice      LABS: reviewed                  A/P:     61 yo woman admitted with pancreatitis    - lipase down to 300 but in pain and with decreased BS  -IVF; restart NPO; monitor temps  -Pain management  -Elevated liver tests due to ETOH hepatitis. No indication for steroids  - outpt f/up pancreatic cyst  hypokalemia hypomagnesemia hypophso sec to gi loss etoh abuse

## 2017-08-09 NOTE — [facility] QUERY NOTE - NSCDIOTHERTXTBX_GEN_ALL_CORE_HH
As per lab results: K 3.1-3.2, Magnesium 1.5, and Phosphorus 1.6. Magnesium sulfate 1 gram IV ordered, KCl PO and IV ordered. Please clarify the significance of these findings in your progress notes:  A. hypokalemia  B. hypomagnesemia  C. hypophosphatemia  D. all of the above or other

## 2017-08-09 NOTE — PROGRESS NOTE ADULT - ASSESSMENT
63 yo female with pancreatitis. Numbers improving. Would slowly advance diet if tolerated and observe.

## 2017-08-10 LAB
ALBUMIN SERPL ELPH-MCNC: 2.5 G/DL — LOW (ref 3.3–5)
ALP SERPL-CCNC: 80 U/L — SIGNIFICANT CHANGE UP (ref 40–120)
ALT FLD-CCNC: 86 U/L — HIGH (ref 12–78)
ANION GAP SERPL CALC-SCNC: 9 MMOL/L — SIGNIFICANT CHANGE UP (ref 5–17)
AST SERPL-CCNC: 73 U/L — HIGH (ref 15–37)
BILIRUB SERPL-MCNC: 2.5 MG/DL — HIGH (ref 0.2–1.2)
BUN SERPL-MCNC: 4 MG/DL — LOW (ref 7–23)
CALCIUM SERPL-MCNC: 8 MG/DL — LOW (ref 8.5–10.1)
CHLORIDE SERPL-SCNC: 100 MMOL/L — SIGNIFICANT CHANGE UP (ref 96–108)
CO2 SERPL-SCNC: 26 MMOL/L — SIGNIFICANT CHANGE UP (ref 22–31)
CREAT SERPL-MCNC: 0.51 MG/DL — SIGNIFICANT CHANGE UP (ref 0.5–1.3)
GLUCOSE SERPL-MCNC: 82 MG/DL — SIGNIFICANT CHANGE UP (ref 70–99)
HCT VFR BLD CALC: 36.1 % — SIGNIFICANT CHANGE UP (ref 34.5–45)
HGB BLD-MCNC: 12.3 G/DL — SIGNIFICANT CHANGE UP (ref 11.5–15.5)
LIDOCAIN IGE QN: 537 U/L — HIGH (ref 73–393)
MCHC RBC-ENTMCNC: 33.8 PG — SIGNIFICANT CHANGE UP (ref 27–34)
MCHC RBC-ENTMCNC: 34 GM/DL — SIGNIFICANT CHANGE UP (ref 32–36)
MCV RBC AUTO: 99.4 FL — SIGNIFICANT CHANGE UP (ref 80–100)
PLATELET # BLD AUTO: 137 K/UL — LOW (ref 150–400)
POTASSIUM SERPL-MCNC: 3.4 MMOL/L — LOW (ref 3.5–5.3)
POTASSIUM SERPL-SCNC: 3.4 MMOL/L — LOW (ref 3.5–5.3)
PROT SERPL-MCNC: 6.5 GM/DL — SIGNIFICANT CHANGE UP (ref 6–8.3)
RBC # BLD: 3.64 M/UL — LOW (ref 3.8–5.2)
RBC # FLD: 10.8 % — SIGNIFICANT CHANGE UP (ref 10.3–14.5)
SODIUM SERPL-SCNC: 135 MMOL/L — SIGNIFICANT CHANGE UP (ref 135–145)
WBC # BLD: 6.6 K/UL — SIGNIFICANT CHANGE UP (ref 3.8–10.5)
WBC # FLD AUTO: 6.6 K/UL — SIGNIFICANT CHANGE UP (ref 3.8–10.5)

## 2017-08-10 RX ADMIN — HYDROMORPHONE HYDROCHLORIDE 1 MILLIGRAM(S): 2 INJECTION INTRAMUSCULAR; INTRAVENOUS; SUBCUTANEOUS at 17:47

## 2017-08-10 RX ADMIN — SODIUM CHLORIDE 125 MILLILITER(S): 9 INJECTION INTRAMUSCULAR; INTRAVENOUS; SUBCUTANEOUS at 17:48

## 2017-08-10 RX ADMIN — HYDROMORPHONE HYDROCHLORIDE 1 MILLIGRAM(S): 2 INJECTION INTRAMUSCULAR; INTRAVENOUS; SUBCUTANEOUS at 09:15

## 2017-08-10 RX ADMIN — HYDROMORPHONE HYDROCHLORIDE 1 MILLIGRAM(S): 2 INJECTION INTRAMUSCULAR; INTRAVENOUS; SUBCUTANEOUS at 22:51

## 2017-08-10 RX ADMIN — Medication 650 MILLIGRAM(S): at 09:17

## 2017-08-10 RX ADMIN — SODIUM CHLORIDE 125 MILLILITER(S): 9 INJECTION INTRAMUSCULAR; INTRAVENOUS; SUBCUTANEOUS at 05:51

## 2017-08-10 RX ADMIN — ENOXAPARIN SODIUM 40 MILLIGRAM(S): 100 INJECTION SUBCUTANEOUS at 21:27

## 2017-08-10 RX ADMIN — HYDROMORPHONE HYDROCHLORIDE 1 MILLIGRAM(S): 2 INJECTION INTRAMUSCULAR; INTRAVENOUS; SUBCUTANEOUS at 13:43

## 2017-08-10 RX ADMIN — PANTOPRAZOLE SODIUM 40 MILLIGRAM(S): 20 TABLET, DELAYED RELEASE ORAL at 11:17

## 2017-08-10 NOTE — PROGRESS NOTE ADULT - ASSESSMENT
63 yo woman admitted with pancreatitis    -Clear liquids  -Continue IVF for today  -Wean pain management as tolerated  -Elevated liver tests due to ETOH hepatitis. No indication for steroids  -Monitor LFTs, UOP, Hct, BUN 63 yo woman admitted with pancreatitis; repeat CT unchanged    -Clear liquids  -Continue IVF for today  -Wean pain management as tolerated  -Elevated liver tests due to ETOH hepatitis. No indication for steroids  -Monitor LFTs, UOP, Hct, BUN

## 2017-08-10 NOTE — PROGRESS NOTE ADULT - SUBJECTIVE AND OBJECTIVE BOX
HPI:  61 yo woman was admitted with acute pancreatitis. Drinks heavily. Had an episode of pancreatitis 23 years ago. Had fever 102 yesterday, CT unchanged. Mild nausea. Having a lot of gas and some liquid stool which relieved abdominal distention from yesterday. Still with pain, but overall improved from admission.    MEDICATIONS  (STANDING):  enoxaparin Injectable 40 milliGRAM(s) SubCutaneous every 24 hours  sodium chloride 0.9%. 1000 milliLiter(s) (125 mL/Hr) IV Continuous <Continuous>  sodium chloride 0.9% 1000 milliLiter(s) (125 mL/Hr) IV Continuous <Continuous>  pantoprazole  Injectable 40 milliGRAM(s) IV Push daily    MEDICATIONS  (PRN):  ondansetron Injectable 4 milliGRAM(s) IV Push every 6 hours PRN Nausea  HYDROmorphone  Injectable 0.5 milliGRAM(s) IV Push every 4 hours PRN Moderate Pain (4 - 6)  HYDROmorphone  Injectable 1 milliGRAM(s) IV Push every 4 hours PRN Severe Pain (7 - 10)  LORazepam     Tablet 2 milliGRAM(s) Oral every 2 hours PRN CIWA-Ar score increase by 2 points and a total score of 7 or less  acetaminophen   Tablet 650 milliGRAM(s) Oral every 6 hours PRN For Temp greater than 38.5 C (101.3 F)      Allergies    No Known Allergies    Intolerances        REVIEW OF SYSTEMS    General: no fever    HEENT: no icterus    Respiratory and Thorax: no SOB  	  Cardiovascular: no CP    Gastrointestinal: as above    Skin: no jaundice      Vital Signs Last 24 Hrs  T(C): 36.7 (10 Aug 2017 05:02), Max: 39.3 (09 Aug 2017 13:30)  T(F): 98.1 (10 Aug 2017 05:02), Max: 102.8 (09 Aug 2017 13:30)  HR: 93 (10 Aug 2017 05:02) (91 - 104)  BP: 121/78 (10 Aug 2017 05:02) (121/78 - 151/84)  BP(mean): --  RR: 18 (09 Aug 2017 23:40) (18 - 18)  SpO2: 100% (10 Aug 2017 05:02) (96% - 100%)    PHYSICAL EXAM:    Constitutional: NAD    HEENT: anicteric    Respiratory: CTA BL    Cardiovascular:  RRR    Gastrointestinal: soft ND +BS epigastric tenderness    Extremities: no LE edema    Neuro: no focal deficits    Skin: no jaundice      LABS:                        12.3   6.6   )-----------( 137      ( 10 Aug 2017 07:06 )             36.1     08-10    135  |  100  |  4<L>  ----------------------------<  82  3.4<L>   |  26  |  0.51    Ca    8.0<L>      10 Aug 2017 07:06  Phos  1.6     08-09  Mg     1.9     08-09    TPro  6.5  /  Alb  2.5<L>  /  TBili  2.5<H>  /  DBili  x   /  AST  73<H>  /  ALT  86<H>  /  AlkPhos  80  08-10      LIVER FUNCTIONS - ( 10 Aug 2017 07:06 )  Alb: 2.5 g/dL / Pro: 6.5 gm/dL / ALK PHOS: 80 U/L / ALT: 86 U/L / AST: 73 U/L / GGT: x             RADIOLOGY & ADDITIONAL STUDIES:

## 2017-08-10 NOTE — PROGRESS NOTE ADULT - SUBJECTIVE AND OBJECTIVE BOX
Called by RN to evaluate pt with c/o shortness of breath. Pt seen and examined at bedside in NAD. C/w abdominal pain related to pancreatitis. Pt states she thought she had SOB because she had abd pain with deep inspiration. Pt denies current SOB, chest pain, chills.   Vital signs  T(F): 102.7 (10 Aug 2017 21:38), Max: 102.7 (10 Aug 2017 21:38)  HR: 97 (10 Aug 2017 21:38) (84 - 103)  BP: 123/63 (10 Aug 2017 21:38) (121/78 - 151/84)  RR: 16 (10 Aug 2017 21:38) (16 - 18)  SpO2: 98% (10 Aug 2017 21:38) (98% - 100%)    Gen: alert and oriented x 3. No acute distress   Lung: CTA b/l no wheezing or crackles or rales  Cardiac: S1 S2 regular  Abdomen: + BS soft, + tenderness     Abdominal pain related to pancreatitis. No active SOB, pt saturating 98% on room air  -Continue pain control and current management  -C/w IVF  -Will reassess  -Tylenol prn for fever

## 2017-08-11 LAB
ADD ON TEST-SPECIMEN IN LAB: SIGNIFICANT CHANGE UP
HCT VFR BLD CALC: 33.8 % — LOW (ref 34.5–45)
HGB BLD-MCNC: 11.7 G/DL — SIGNIFICANT CHANGE UP (ref 11.5–15.5)
LIDOCAIN IGE QN: 293 U/L — SIGNIFICANT CHANGE UP (ref 73–393)
MCHC RBC-ENTMCNC: 34.3 PG — HIGH (ref 27–34)
MCHC RBC-ENTMCNC: 34.5 GM/DL — SIGNIFICANT CHANGE UP (ref 32–36)
MCV RBC AUTO: 99.5 FL — SIGNIFICANT CHANGE UP (ref 80–100)
PLATELET # BLD AUTO: 141 K/UL — LOW (ref 150–400)
PROCALCITONIN SERPL-MCNC: 0.68 NG/ML — HIGH (ref 0–0.04)
RBC # BLD: 3.4 M/UL — LOW (ref 3.8–5.2)
RBC # FLD: 11 % — SIGNIFICANT CHANGE UP (ref 10.3–14.5)
WBC # BLD: 6.6 K/UL — SIGNIFICANT CHANGE UP (ref 3.8–10.5)
WBC # FLD AUTO: 6.6 K/UL — SIGNIFICANT CHANGE UP (ref 3.8–10.5)

## 2017-08-11 PROCEDURE — 71010: CPT | Mod: 26

## 2017-08-11 RX ORDER — SODIUM CHLORIDE 9 MG/ML
1000 INJECTION, SOLUTION INTRAVENOUS
Qty: 0 | Refills: 0 | Status: DISCONTINUED | OUTPATIENT
Start: 2017-08-11 | End: 2017-08-15

## 2017-08-11 RX ADMIN — HYDROMORPHONE HYDROCHLORIDE 1 MILLIGRAM(S): 2 INJECTION INTRAMUSCULAR; INTRAVENOUS; SUBCUTANEOUS at 10:00

## 2017-08-11 RX ADMIN — HYDROMORPHONE HYDROCHLORIDE 1 MILLIGRAM(S): 2 INJECTION INTRAMUSCULAR; INTRAVENOUS; SUBCUTANEOUS at 13:34

## 2017-08-11 RX ADMIN — ENOXAPARIN SODIUM 40 MILLIGRAM(S): 100 INJECTION SUBCUTANEOUS at 20:25

## 2017-08-11 RX ADMIN — HYDROMORPHONE HYDROCHLORIDE 1 MILLIGRAM(S): 2 INJECTION INTRAMUSCULAR; INTRAVENOUS; SUBCUTANEOUS at 05:19

## 2017-08-11 RX ADMIN — HYDROMORPHONE HYDROCHLORIDE 1 MILLIGRAM(S): 2 INJECTION INTRAMUSCULAR; INTRAVENOUS; SUBCUTANEOUS at 14:06

## 2017-08-11 RX ADMIN — PANTOPRAZOLE SODIUM 40 MILLIGRAM(S): 20 TABLET, DELAYED RELEASE ORAL at 11:40

## 2017-08-11 RX ADMIN — SODIUM CHLORIDE 125 MILLILITER(S): 9 INJECTION, SOLUTION INTRAVENOUS at 14:55

## 2017-08-11 RX ADMIN — HYDROMORPHONE HYDROCHLORIDE 1 MILLIGRAM(S): 2 INJECTION INTRAMUSCULAR; INTRAVENOUS; SUBCUTANEOUS at 09:30

## 2017-08-11 RX ADMIN — HYDROMORPHONE HYDROCHLORIDE 1 MILLIGRAM(S): 2 INJECTION INTRAMUSCULAR; INTRAVENOUS; SUBCUTANEOUS at 20:30

## 2017-08-11 NOTE — PROGRESS NOTE ADULT - SUBJECTIVE AND OBJECTIVE BOX
HPI:  63 yo woman was admitted with acute pancreatitis. Drinks heavily. Had an episode of pancreatitis 23 years ago. Had fever 102 again, CT unchanged no necrosis or fluid collections. Labs improved with hydration, lipase now normal. Mild nausea. Still with pain, but overall improved from admission. Feels that diet exacerbates symptoms.    MEDICATIONS  (STANDING):  enoxaparin Injectable 40 milliGRAM(s) SubCutaneous every 24 hours  pantoprazole  Injectable 40 milliGRAM(s) IV Push daily  dextrose 5% + sodium chloride 0.9% 1000 milliLiter(s) (125 mL/Hr) IV Continuous <Continuous>    MEDICATIONS  (PRN):  ondansetron Injectable 4 milliGRAM(s) IV Push every 6 hours PRN Nausea  HYDROmorphone  Injectable 0.5 milliGRAM(s) IV Push every 4 hours PRN Moderate Pain (4 - 6)  HYDROmorphone  Injectable 1 milliGRAM(s) IV Push every 4 hours PRN Severe Pain (7 - 10)  acetaminophen   Tablet 650 milliGRAM(s) Oral every 6 hours PRN For Temp greater than 38.5 C (101.3 F)      Allergies    No Known Allergies    Intolerances        REVIEW OF SYSTEMS    General: fevers as above    HEENT: no icterus    Respiratory and Thorax: no SOB  	  Cardiovascular: no CP    Gastrointestinal: as above    Skin: no jaundice      Vital Signs Last 24 Hrs  T(C): 37.2 (11 Aug 2017 11:30), Max: 39.3 (10 Aug 2017 21:38)  T(F): 98.9 (11 Aug 2017 11:30), Max: 102.7 (10 Aug 2017 21:38)  HR: 82 (11 Aug 2017 11:30) (82 - 97)  BP: 121/55 (11 Aug 2017 11:30) (99/65 - 124/60)  BP(mean): --  RR: 17 (11 Aug 2017 11:30) (16 - 18)  SpO2: 98% (11 Aug 2017 11:30) (96% - 99%)    PHYSICAL EXAM:    Constitutional: NAD    HEENT: anicteric    Respiratory: CTA BL    Cardiovascular:  RRR    Gastrointestinal: soft ND +BS mild generalized tenderness    Extremities: no LE edema    Neuro: no focal deficits    Skin: no jaundice      LABS:                        11.7   6.6   )-----------( 141      ( 11 Aug 2017 09:24 )             33.8     08-10    135  |  100  |  4<L>  ----------------------------<  82  3.4<L>   |  26  |  0.51    Ca    8.0<L>      10 Aug 2017 07:06    TPro  6.5  /  Alb  2.5<L>  /  TBili  2.5<H>  /  DBili  x   /  AST  73<H>  /  ALT  86<H>  /  AlkPhos  80  08-10      LIVER FUNCTIONS - ( 10 Aug 2017 07:06 )  Alb: 2.5 g/dL / Pro: 6.5 gm/dL / ALK PHOS: 80 U/L / ALT: 86 U/L / AST: 73 U/L / GGT: x             RADIOLOGY & ADDITIONAL STUDIES:

## 2017-08-11 NOTE — CONSULT NOTE ADULT - ASSESSMENT
62F with Hx of PUD, Etoh dependence and Pancreatitis in the past admitted on 8/7 for evaluation of abdominal difuse across mid abdomen, associated with nausea; found to have acute pancreatitis. Of note the patient was made NPO, was stable received peripheral nutrition, however, clear diet was started. Lab work had improved, over last 24-48 hours has been having fevers to 102.7, there is no new complaints, abdominal pain is slightly better, no diarrhea, cough, no other specific new complaints.  1. Patient admitted with acute pancreatitis, lab work improved and no evidence of necrotizing pancreatitis or pseudocyst on imaging now with fever  - no new complaints or physical exam findings to suggest a new infectious disease process  - fever most likely secondary to the inflammation of the pancreas and possibly due to diet that was introduced making inflammation a little more increased  - suggest holding on diet for now  - would not start antibiotics as not necrotizing or hemorrhagic component of pancreatitis  - iv hydration and supportive care   - discussed at length with patient and hospitalist  Will follow

## 2017-08-11 NOTE — CONSULT NOTE ADULT - SUBJECTIVE AND OBJECTIVE BOX
HPI:  62F with Hx of PUD, Etoh dependence and Pancreatitis in the past admitted on 8/7 for evaluation of abdominal difuse across mid abdomen, associated with nausea; found to have acute pancreatitis. Of note the patient was made NPO, was stable received peripheral nutrition, however, clear diet was started. Lab work had improved, over last 24-48 hours has been having fevers to 102.7, there is no new complaints, abdominal pain is slightly better, no diarrhea, cough, no other specific new complaints.            PMH: as above  PSH: as above  Meds: per reconcilation sheet, noted below  MEDICATIONS  (STANDING):  enoxaparin Injectable 40 milliGRAM(s) SubCutaneous every 24 hours  sodium chloride 0.9%. 1000 milliLiter(s) (125 mL/Hr) IV Continuous <Continuous>  pantoprazole  Injectable 40 milliGRAM(s) IV Push daily    MEDICATIONS  (PRN):  ondansetron Injectable 4 milliGRAM(s) IV Push every 6 hours PRN Nausea  HYDROmorphone  Injectable 0.5 milliGRAM(s) IV Push every 4 hours PRN Moderate Pain (4 - 6)  HYDROmorphone  Injectable 1 milliGRAM(s) IV Push every 4 hours PRN Severe Pain (7 - 10)  acetaminophen   Tablet 650 milliGRAM(s) Oral every 6 hours PRN For Temp greater than 38.5 C (101.3 F)    Allergies    No Known Allergies    Intolerances      Social: no smoking, no alcohol, no illegal drugs; no recent travel, no exposure to TB  FAMILY HISTORY:    ROS: the patient has  no chills, no HA, no dizziness, no sore throat, no blurry vision, no CP, no palpitations, no SOB, no cough,  no diarrhea, no N/V, no dysuria, no leg pain, no claudication, no rash, no joint aches, no rectal pain or bleeding, no night sweats  Vital Signs Last 24 Hrs  T(C): 38 (11 Aug 2017 05:40), Max: 39.3 (10 Aug 2017 21:38)  T(F): 100.4 (11 Aug 2017 05:40), Max: 102.7 (10 Aug 2017 21:38)  HR: 96 (11 Aug 2017 05:40) (84 - 97)  BP: 99/65 (11 Aug 2017 05:40) (99/65 - 124/60)  BP(mean): --  RR: 16 (10 Aug 2017 21:38) (16 - 18)  SpO2: 96% (11 Aug 2017 05:40) (96% - 99%)  Daily     Daily   Constitutional: well developed, well nourished  HEENT: NC/AT, EOMI, PERRLA  Neck: supple  Respiratory: clear, no r/r/w  Cardiovascular: S1S2 regular, no murmurs  Abdomen: soft, mild tenderness in mid abdomen, no rebound or guarding, not distended, positive BS  Genitourinary: deferred  Rectal: deferred  Musculoskeletal: no muscle tenderness, no joint swelling or tenderness  Neurological: AxOx3, moving all extremities, no focal deficits  Skin: no rashes                          11.7   6.6   )-----------( 141      ( 11 Aug 2017 09:24 )             33.8     08-10    135  |  100  |  4<L>  ----------------------------<  82  3.4<L>   |  26  |  0.51    Ca    8.0<L>      10 Aug 2017 07:06    TPro  6.5  /  Alb  2.5<L>  /  TBili  2.5<H>  /  DBili  x   /  AST  73<H>  /  ALT  86<H>  /  AlkPhos  80  08-10     LIVER FUNCTIONS - ( 10 Aug 2017 07:06 )  Alb: 2.5 g/dL / Pro: 6.5 gm/dL / ALK PHOS: 80 U/L / ALT: 86 U/L / AST: 73 U/L / GGT: x                 Radiology:< from: CT Abdomen and Pelvis w/ Oral Cont and w/ IV Cont (08.09.17 @ 18:05) >  EXAM:  CT ABDOMEN AND PELVIS OC IC                            PROCEDURE DATE:  08/09/2017          INTERPRETATION:  CT OF THE ABDOMEN AND PELVIS WITH IV CONTRAST     CLINICAL INDICATION: acute pancreatitis and fever    TECHNIQUE: CT scan of the abdomen and pelvis was performed from the domes   of the diaphragm to the symphysis pubis with oral and intravenous   contrast.  Intravenous administration of 90 cc of Omnipaque-350, 10 cc   discarded, was without complication.  Sagittal and coronal reformatted   images were provided.    COMPARISON: CT of August 7, 2017    FINDINGS:   LOWER THORAX: Tiny bilateral pleural effusions with underlying   compressive atelectasis..    LIVER:  Fatty infiltration  GALLBLADDER: Unremarkable.  BILIARY TREE: Unremarkable.  PANCREAS: Peripancreatic inflammation appears not significantly changed   since the prior exam, compatible with stable acute pancreatitis. No focal   fluid collection or abscess is identified. 6 mm hypodensity in the   uncinate, is unchanged since prior exam..  SPLEEN: Unremarkable.  ADRENALS: Unremarkable.  KIDNEYS/URETERS: Unremarkable.    BOWEL:  No bowel obstruction or inflammatory process.  Normal appendix.   PERITONEUM/RETROPERITONEUM:  See above.  BLADDER: Unremarkable.  REPRODUCTIVE ORGANS: Unremarkable.    VASCULATURE: Within normal limits.  ABDOMINAL WALL:  Unremarkable    BONES: No aggressive osseous lesion.    IMPRESSION:  Stable findings compatible with acute pancreatitis, unchanged since prior   exam of 2 days earlier.No focal fluid collection or abscess. 6 mm   hypodensity in the uncinate is unchanged. Follow-up MRI/MRCP with and   without contrast once acute inflammation has subsided.        < end of copied text >      Advanced directive addressed: full resuscitation

## 2017-08-11 NOTE — PROGRESS NOTE ADULT - SUBJECTIVE AND OBJECTIVE BOX
HPI:  61 yo woman was admitted with acute pancreatitis. Drinks heavily. Had an episode of pancreatitis 23 years ago. No n/v. No fever. +epigastric pain. Tm 102;   8/10: feels better today almost pain free; CT noted no psudocyst      PAST MEDICAL & SURGICAL HISTORY:  Alcohol dependence  Pancreatitis  PUD (peptic ulcer disease)  No significant past surgical history    Vital Signs Last 24 Hrs  T(C): 37.3 (10 Aug 2017 11:43), Max: 39.3 (09 Aug 2017 13:30)  T(F): 99.2 (10 Aug 2017 11:43), Max: 102.8 (09 Aug 2017 13:30)  HR: 84 (10 Aug 2017 11:43) (84 - 104)  BP: 122/54 (10 Aug 2017 11:43) (121/78 - 151/84)  BP(mean): --  RR: 18 (10 Aug 2017 11:43) (18 - 18)  SpO2: 98% (10 Aug 2017 11:43) (96% - 100%)  PHYSICAL EXAM:    Constitutional: NAD    Head: NCAT    HEENT: anicteric    Neck: no abnormal lymphadenopathy    Respiratory: CTA BL    Cardiovascular:  RRR    Gastrointestinal: more distended, decreased BS +epigastric tenderness    Extremities: no LE edema    Neuro: no focal deficits    Skin: no jaundice      LABS: reviewed                  A/P:     61 yo woman admitted with pancreatitis: perisitent pain and fever    - lipase pending , but febrile; case d/w dr. Jeffery; will wait until alec before repeating the CT scan  - start liquid diet  -IVF; monitor temps  - pending bl cx; no localizing signs to indicate other infectious sourced  - outpt f/up pancreatic cyst  - dvt px: LMWH  - add MVI to the bag and d5    Case d/w daughter HPI:  63 yo woman was admitted with acute pancreatitis. Drinks heavily. Had an episode of pancreatitis 23 years ago. No n/v. No fever. +epigastric pain. Tm 102;   8/10: feels better today almost pain free; CT noted no psudocyst      PAST MEDICAL & SURGICAL HISTORY:  Alcohol dependence  Pancreatitis  PUD (peptic ulcer disease)  No significant past surgical history    Vital Signs Last 24 Hrs  T(C): 37.3 (10 Aug 2017 11:43), Max: 39.3 (09 Aug 2017 13:30)  T(F): 99.2 (10 Aug 2017 11:43), Max: 102.8 (09 Aug 2017 13:30)  HR: 84 (10 Aug 2017 11:43) (84 - 104)  BP: 122/54 (10 Aug 2017 11:43) (121/78 - 151/84)  BP(mean): --  RR: 18 (10 Aug 2017 11:43) (18 - 18)  SpO2: 98% (10 Aug 2017 11:43) (96% - 100%)  PHYSICAL EXAM:    Constitutional: NAD    Head: NCAT    HEENT: anicteric    Neck: no abnormal lymphadenopathy    Respiratory: CTA BL    Cardiovascular:  RRR    Gastrointestinal: more distended, decreased BS +epigastric tenderness    Extremities: no LE edema    Neuro: no focal deficits    Skin: no jaundice      LABS: reviewed                  A/P:     63 yo woman admitted with pancreatitis: perisitent pain and fever    - lipase pending , but febrile; case d/w dr. Jeffery; will wait until alec before repeating the CT scan  -IVF; monitor temps  - pending bl cx; no localizing signs to indicate other infectious sourced  - outpt f/up pancreatic cyst  - dvt px: LMWH  - add MVI to the bag and d5    Case d/w daughter

## 2017-08-11 NOTE — PROGRESS NOTE ADULT - ASSESSMENT
63 yo woman admitted with pancreatitis; intermittant fevers; repeat CT unchanged    -NPO for now  -Continue IVF   -Wean pain management as tolerated  -Elevated liver tests due to ETOH hepatitis. No indication for steroids  -Monitor LFTs, UOP, Hct, BUN

## 2017-08-12 LAB — LIDOCAIN IGE QN: 311 U/L — SIGNIFICANT CHANGE UP (ref 73–393)

## 2017-08-12 RX ORDER — SODIUM CHLORIDE 9 MG/ML
1000 INJECTION INTRAMUSCULAR; INTRAVENOUS; SUBCUTANEOUS
Qty: 0 | Refills: 0 | Status: COMPLETED | OUTPATIENT
Start: 2017-08-12 | End: 2017-08-13

## 2017-08-12 RX ADMIN — PANTOPRAZOLE SODIUM 40 MILLIGRAM(S): 20 TABLET, DELAYED RELEASE ORAL at 11:08

## 2017-08-12 RX ADMIN — HYDROMORPHONE HYDROCHLORIDE 1 MILLIGRAM(S): 2 INJECTION INTRAMUSCULAR; INTRAVENOUS; SUBCUTANEOUS at 20:54

## 2017-08-12 RX ADMIN — HYDROMORPHONE HYDROCHLORIDE 1 MILLIGRAM(S): 2 INJECTION INTRAMUSCULAR; INTRAVENOUS; SUBCUTANEOUS at 15:50

## 2017-08-12 RX ADMIN — HYDROMORPHONE HYDROCHLORIDE 1 MILLIGRAM(S): 2 INJECTION INTRAMUSCULAR; INTRAVENOUS; SUBCUTANEOUS at 21:10

## 2017-08-12 RX ADMIN — SODIUM CHLORIDE 250 MILLILITER(S): 9 INJECTION INTRAMUSCULAR; INTRAVENOUS; SUBCUTANEOUS at 11:52

## 2017-08-12 RX ADMIN — SODIUM CHLORIDE 125 MILLILITER(S): 9 INJECTION, SOLUTION INTRAVENOUS at 05:21

## 2017-08-12 RX ADMIN — ENOXAPARIN SODIUM 40 MILLIGRAM(S): 100 INJECTION SUBCUTANEOUS at 20:54

## 2017-08-12 RX ADMIN — HYDROMORPHONE HYDROCHLORIDE 1 MILLIGRAM(S): 2 INJECTION INTRAMUSCULAR; INTRAVENOUS; SUBCUTANEOUS at 00:36

## 2017-08-12 RX ADMIN — HYDROMORPHONE HYDROCHLORIDE 1 MILLIGRAM(S): 2 INJECTION INTRAMUSCULAR; INTRAVENOUS; SUBCUTANEOUS at 05:22

## 2017-08-12 RX ADMIN — HYDROMORPHONE HYDROCHLORIDE 1 MILLIGRAM(S): 2 INJECTION INTRAMUSCULAR; INTRAVENOUS; SUBCUTANEOUS at 16:27

## 2017-08-12 RX ADMIN — HYDROMORPHONE HYDROCHLORIDE 1 MILLIGRAM(S): 2 INJECTION INTRAMUSCULAR; INTRAVENOUS; SUBCUTANEOUS at 11:08

## 2017-08-12 RX ADMIN — HYDROMORPHONE HYDROCHLORIDE 1 MILLIGRAM(S): 2 INJECTION INTRAMUSCULAR; INTRAVENOUS; SUBCUTANEOUS at 11:48

## 2017-08-12 NOTE — PROGRESS NOTE ADULT - SUBJECTIVE AND OBJECTIVE BOX
HPI:  62F with Hx of PUD, Etoh dependence and Pancreatitis in the past admitted on 8/7 for evaluation of abdominal difuse across mid abdomen, associated with nausea; found to have acute pancreatitis. Of note the patient was made NPO, was stable received peripheral nutrition, however, clear diet was started. Lab work had improved, over last 24-48 hours has been having fevers to 102.7, there is no new complaints, abdominal pain is slightly better, no diarrhea, cough, no other specific new complaints.    Fever is down  NPO  Mild abdominal discomfort    MEDICATIONS  (STANDING):  enoxaparin Injectable 40 milliGRAM(s) SubCutaneous every 24 hours  pantoprazole  Injectable 40 milliGRAM(s) IV Push daily  dextrose 5% + sodium chloride 0.9% 1000 milliLiter(s) (125 mL/Hr) IV Continuous <Continuous>  sodium chloride 0.9%. 1000 milliLiter(s) (250 mL/Hr) IV Continuous <Continuous>    MEDICATIONS  (PRN):  ondansetron Injectable 4 milliGRAM(s) IV Push every 6 hours PRN Nausea  HYDROmorphone  Injectable 0.5 milliGRAM(s) IV Push every 4 hours PRN Moderate Pain (4 - 6)  HYDROmorphone  Injectable 1 milliGRAM(s) IV Push every 4 hours PRN Severe Pain (7 - 10)  acetaminophen   Tablet 650 milliGRAM(s) Oral every 6 hours PRN For Temp greater than 38.5 C (101.3 F)      Vital Signs Last 24 Hrs  T(C): 38.2 (12 Aug 2017 11:25), Max: 38.2 (12 Aug 2017 11:25)  T(F): 100.7 (12 Aug 2017 11:25), Max: 100.7 (12 Aug 2017 11:25)  HR: 88 (12 Aug 2017 11:25) (84 - 88)  BP: 112/57 (12 Aug 2017 11:25) (107/57 - 128/71)  BP(mean): --  RR: 17 (12 Aug 2017 11:25) (17 - 18)  SpO2: 97% (12 Aug 2017 11:25) (95% - 98%)    Physical Exam:      Constitutional: well developed, well nourished  HEENT: NC/AT, EOMI, PERRLA  Neck: supple  Respiratory: clear, no r/r/w  Cardiovascular: S1S2 regular, no murmurs  Abdomen: soft, mild tenderness in mid abdomen, no rebound or guarding, not distended, positive BS  Genitourinary: deferred  Rectal: deferred  Musculoskeletal: no muscle tenderness, no joint swelling or tenderness  Neurological: AxOx3, moving all extremities, no focal deficits  Skin: no rashes                          11.7   6.6   )-----------( 141      ( 11 Aug 2017 09:24 )             33.8     08-10    135  |  100  |  4<L>  ----------------------------<  82  3.4<L>   |  26  |  0.51    Ca    8.0<L>      10 Aug 2017 07:06    TPro  6.5  /  Alb  2.5<L>  /  TBili  2.5<H>  /  DBili  x   /  AST  73<H>  /  ALT  86<H>  /  AlkPhos  80  08-10     LIVER FUNCTIONS - ( 10 Aug 2017 07:06 )  Alb: 2.5 g/dL / Pro: 6.5 gm/dL / ALK PHOS: 80 U/L / ALT: 86 U/L / AST: 73 U/L / GGT: x                 Radiology:< from: CT Abdomen and Pelvis w/ Oral Cont and w/ IV Cont (08.09.17 @ 18:05) >  EXAM:  CT ABDOMEN AND PELVIS OC IC                            PROCEDURE DATE:  08/09/2017          INTERPRETATION:  CT OF THE ABDOMEN AND PELVIS WITH IV CONTRAST     CLINICAL INDICATION: acute pancreatitis and fever    TECHNIQUE: CT scan of the abdomen and pelvis was performed from the domes   of the diaphragm to the symphysis pubis with oral and intravenous   contrast.  Intravenous administration of 90 cc of Omnipaque-350, 10 cc   discarded, was without complication.  Sagittal and coronal reformatted   images were provided.    COMPARISON: CT of August 7, 2017    FINDINGS:   LOWER THORAX: Tiny bilateral pleural effusions with underlying   compressive atelectasis..    LIVER:  Fatty infiltration  GALLBLADDER: Unremarkable.  BILIARY TREE: Unremarkable.  PANCREAS: Peripancreatic inflammation appears not significantly changed   since the prior exam, compatible with stable acute pancreatitis. No focal   fluid collection or abscess is identified. 6 mm hypodensity in the   uncinate, is unchanged since prior exam..  SPLEEN: Unremarkable.  ADRENALS: Unremarkable.  KIDNEYS/URETERS: Unremarkable.    BOWEL:  No bowel obstruction or inflammatory process.  Normal appendix.   PERITONEUM/RETROPERITONEUM:  See above.  BLADDER: Unremarkable.  REPRODUCTIVE ORGANS: Unremarkable.    VASCULATURE: Within normal limits.  ABDOMINAL WALL:  Unremarkable    BONES: No aggressive osseous lesion.    IMPRESSION:  Stable findings compatible with acute pancreatitis, unchanged since prior   exam of 2 days earlier.No focal fluid collection or abscess. 6 mm   hypodensity in the uncinate is unchanged. Follow-up MRI/MRCP with and   without contrast once acute inflammation has subsided.        < end of copied text >      Advanced directive addressed: full resuscitation

## 2017-08-12 NOTE — PROGRESS NOTE ADULT - ASSESSMENT
62F with Hx of PUD, Etoh dependence and Pancreatitis in the past admitted on 8/7 for evaluation of abdominal difuse across mid abdomen, associated with nausea; found to have acute pancreatitis. Of note the patient was made NPO, was stable received peripheral nutrition, however, clear diet was started. Lab work had improved, over last 24-48 hours has been having fevers to 102.7, there is no new complaints, abdominal pain is slightly better, no diarrhea, cough, no other specific new complaints.  1. Patient admitted with acute pancreatitis, lab work improved and no evidence of necrotizing pancreatitis or pseudocyst on imaging now with fever  - no new complaints or physical exam findings to suggest a new infectious disease process  - fever most likely secondary to the inflammation of the pancreas and possibly due to diet that was introduced making inflammation a little more increased  - NPO  - would not start antibiotics as not necrotizing or hemorrhagic component of pancreatitis  - iv hydration and supportive care   - would continue to observe off abx for now  Will follow

## 2017-08-12 NOTE — PROGRESS NOTE ADULT - SUBJECTIVE AND OBJECTIVE BOX
HPI:  63 yo woman was admitted with acute pancreatitis. Drinks heavily. Had an episode of pancreatitis 23 years ago. Was having temp spikes, CT unchanged: no necrosis or fluid collections. Labs improved with hydration, lipase normal. Didn't get fluids last night, says she feels dehydrated. Pain ongoing but slowly improving. Less gaseous distention.    MEDICATIONS  (STANDING):  enoxaparin Injectable 40 milliGRAM(s) SubCutaneous every 24 hours  pantoprazole  Injectable 40 milliGRAM(s) IV Push daily  dextrose 5% + sodium chloride 0.9% 1000 milliLiter(s) (125 mL/Hr) IV Continuous <Continuous>  sodium chloride 0.9%. 1000 milliLiter(s) (250 mL/Hr) IV Continuous <Continuous>    MEDICATIONS  (PRN):  ondansetron Injectable 4 milliGRAM(s) IV Push every 6 hours PRN Nausea  HYDROmorphone  Injectable 0.5 milliGRAM(s) IV Push every 4 hours PRN Moderate Pain (4 - 6)  HYDROmorphone  Injectable 1 milliGRAM(s) IV Push every 4 hours PRN Severe Pain (7 - 10)  acetaminophen   Tablet 650 milliGRAM(s) Oral every 6 hours PRN For Temp greater than 38.5 C (101.3 F)      Allergies    No Known Allergies    Intolerances        REVIEW OF SYSTEMS    General: low grade temp    HEENT: no icterus    Respiratory and Thorax: no SOB  	  Cardiovascular: no CP    Gastrointestinal: as above    Skin: no jaundice      Vital Signs Last 24 Hrs  T(C): 38.2 (12 Aug 2017 11:25), Max: 38.2 (12 Aug 2017 11:25)  T(F): 100.7 (12 Aug 2017 11:25), Max: 100.7 (12 Aug 2017 11:25)  HR: 88 (12 Aug 2017 11:25) (84 - 88)  BP: 112/57 (12 Aug 2017 11:25) (107/57 - 128/71)  BP(mean): --  RR: 17 (12 Aug 2017 11:25) (17 - 18)  SpO2: 97% (12 Aug 2017 11:25) (95% - 98%)    PHYSICAL EXAM:    Constitutional: NAD    HEENT: anicteric    Respiratory: CTA BL    Cardiovascular:  RRR    Gastrointestinal: soft ND +BS mild generalized tenderness    Extremities: no LE edema    Neuro: no focal deficits    Skin: no jaundice      LABS:                        11.7   6.6   )-----------( 141      ( 11 Aug 2017 09:24 )             33.8                 RADIOLOGY & ADDITIONAL STUDIES:

## 2017-08-12 NOTE — PROGRESS NOTE ADULT - SUBJECTIVE AND OBJECTIVE BOX
HPI:  63 yo woman was admitted with acute pancreatitis. Drinks heavily. Had an episode of pancreatitis 23 years ago. No n/v. No fever. +epigastric pain. Tm 102;   8/10: feels better today almost pain free; CT noted no psudocyst  8/12: c/w abd pain 7/10; aferbrile      PAST MEDICAL & SURGICAL HISTORY:  Alcohol dependence  Pancreatitis  PUD (peptic ulcer disease)  No significant past surgical history    Vital Signs Last 24 Hrs  T(C): 38.2 (12 Aug 2017 11:25), Max: 38.2 (12 Aug 2017 11:25)  T(F): 100.7 (12 Aug 2017 11:25), Max: 100.7 (12 Aug 2017 11:25)  HR: 88 (12 Aug 2017 11:25) (84 - 88)  BP: 112/57 (12 Aug 2017 11:25) (107/57 - 128/71)  BP(mean): --  RR: 17 (12 Aug 2017 11:25) (17 - 18)  SpO2: 97% (12 Aug 2017 11:25) (95% - 98%)    Constitutional: NAD    Head: NCAT    HEENT: anicteric    Neck: no abnormal lymphadenopathy    Respiratory: CTA BL    Cardiovascular:  RRR    Gastrointestinal: more distended, decreased BS +epigastric tenderness    Extremities: no LE edema    Neuro: no focal deficits    Skin: no jaundice      LABS: reviewed                  A/P:     63 yo woman admitted with pancreatitis: persistent pain and fever    - NPO  -IVF; monitor temps  - outpt f/up pancreatic cyst  - dvt px: LMWH  - add MVI to the bag and d5    Case d/w daughter

## 2017-08-13 LAB — LIDOCAIN IGE QN: 219 U/L — SIGNIFICANT CHANGE UP (ref 73–393)

## 2017-08-13 RX ADMIN — HYDROMORPHONE HYDROCHLORIDE 1 MILLIGRAM(S): 2 INJECTION INTRAMUSCULAR; INTRAVENOUS; SUBCUTANEOUS at 13:16

## 2017-08-13 RX ADMIN — ENOXAPARIN SODIUM 40 MILLIGRAM(S): 100 INJECTION SUBCUTANEOUS at 20:47

## 2017-08-13 RX ADMIN — HYDROMORPHONE HYDROCHLORIDE 1 MILLIGRAM(S): 2 INJECTION INTRAMUSCULAR; INTRAVENOUS; SUBCUTANEOUS at 04:15

## 2017-08-13 RX ADMIN — HYDROMORPHONE HYDROCHLORIDE 1 MILLIGRAM(S): 2 INJECTION INTRAMUSCULAR; INTRAVENOUS; SUBCUTANEOUS at 03:58

## 2017-08-13 RX ADMIN — PANTOPRAZOLE SODIUM 40 MILLIGRAM(S): 20 TABLET, DELAYED RELEASE ORAL at 11:17

## 2017-08-13 RX ADMIN — SODIUM CHLORIDE 250 MILLILITER(S): 9 INJECTION INTRAMUSCULAR; INTRAVENOUS; SUBCUTANEOUS at 03:58

## 2017-08-13 RX ADMIN — HYDROMORPHONE HYDROCHLORIDE 1 MILLIGRAM(S): 2 INJECTION INTRAMUSCULAR; INTRAVENOUS; SUBCUTANEOUS at 20:47

## 2017-08-13 RX ADMIN — HYDROMORPHONE HYDROCHLORIDE 1 MILLIGRAM(S): 2 INJECTION INTRAMUSCULAR; INTRAVENOUS; SUBCUTANEOUS at 13:45

## 2017-08-13 RX ADMIN — SODIUM CHLORIDE 125 MILLILITER(S): 9 INJECTION, SOLUTION INTRAVENOUS at 17:55

## 2017-08-13 NOTE — PROGRESS NOTE ADULT - SUBJECTIVE AND OBJECTIVE BOX
HPI:  61 yo woman was admitted with acute pancreatitis. Drinks heavily. Had an episode of pancreatitis 23 years ago. No n/v. No fever. +epigastric pain. Tm 102;   8/10: feels better today almost pain free; CT noted no psudocyst  8/12: c/w abd pain 7/10; aferbrile  8/13: improved, pain 6/10, much better than yestreday      PAST MEDICAL & SURGICAL HISTORY:  Alcohol dependence  Pancreatitis  PUD (peptic ulcer disease)  No significant past surgical history     Vital Signs Last 24 Hrs  T(C): 37.8 (13 Aug 2017 11:32), Max: 37.8 (13 Aug 2017 11:32)  T(F): 100.1 (13 Aug 2017 11:32), Max: 100.1 (13 Aug 2017 11:32)  HR: 80 (13 Aug 2017 11:32) (75 - 86)  BP: 128/76 (13 Aug 2017 11:32) (111/62 - 128/76)  BP(mean): --  RR: 18 (13 Aug 2017 11:32) (18 - 18)  SpO2: 98% (13 Aug 2017 11:32) (96% - 99%)    Constitutional: NAD    Head: NCAT    HEENT: anicteric    Neck: no abnormal lymphadenopathy    Respiratory: CTA BL    Cardiovascular:  RRR    Gastrointestinal: more distended, decreased BS +epigastric tenderness    Extremities: no LE edema    Neuro: no focal deficits    Skin: no jaundice      LABS: reviewed                  A/P:     61 yo woman admitted with pancreatitis: persistent pain and fever    - NPO; plan for diet in am; still with low grade fever  -IVF; monitor temps  - outpt f/up pancreatic cyst  - dvt px: LMWH  - add MVI to the bag and d5    Case d/w daughter

## 2017-08-13 NOTE — PROGRESS NOTE ADULT - ASSESSMENT
63 yo woman admitted with pancreatitis; intermittant fevers; repeat CT unchanged    -NPO for now, consider clears in AM  -Continue IVF   -Wean pain management as tolerated  -Elevated liver tests due to ETOH hepatitis. No indication for steroids  -Monitor LFTs, UOP, Hct, BUN    ETOH rehab DW pt

## 2017-08-14 LAB
BUN SERPL-MCNC: <1 MG/DL — LOW (ref 7–23)
CREAT SERPL-MCNC: 0.52 MG/DL — SIGNIFICANT CHANGE UP (ref 0.5–1.3)
CULTURE RESULTS: SIGNIFICANT CHANGE UP
CULTURE RESULTS: SIGNIFICANT CHANGE UP
SPECIMEN SOURCE: SIGNIFICANT CHANGE UP
SPECIMEN SOURCE: SIGNIFICANT CHANGE UP

## 2017-08-14 PROCEDURE — 74177 CT ABD & PELVIS W/CONTRAST: CPT | Mod: 26

## 2017-08-14 RX ADMIN — ENOXAPARIN SODIUM 40 MILLIGRAM(S): 100 INJECTION SUBCUTANEOUS at 21:48

## 2017-08-14 RX ADMIN — PANTOPRAZOLE SODIUM 40 MILLIGRAM(S): 20 TABLET, DELAYED RELEASE ORAL at 11:38

## 2017-08-14 RX ADMIN — SODIUM CHLORIDE 125 MILLILITER(S): 9 INJECTION, SOLUTION INTRAVENOUS at 12:13

## 2017-08-14 RX ADMIN — HYDROMORPHONE HYDROCHLORIDE 1 MILLIGRAM(S): 2 INJECTION INTRAMUSCULAR; INTRAVENOUS; SUBCUTANEOUS at 03:55

## 2017-08-14 RX ADMIN — HYDROMORPHONE HYDROCHLORIDE 1 MILLIGRAM(S): 2 INJECTION INTRAMUSCULAR; INTRAVENOUS; SUBCUTANEOUS at 22:58

## 2017-08-14 NOTE — CHART NOTE - NSCHARTNOTEFT_GEN_A_CORE
Pain returns after each meal; plan to do CT abd and asses pancreas re: process resolution vs persistence; GI aware

## 2017-08-14 NOTE — PROGRESS NOTE ADULT - SUBJECTIVE AND OBJECTIVE BOX
HPI:  63 yo woman was admitted with acute pancreatitis. Drinks heavily. Had an episode of pancreatitis 23 years ago.     8/10: feels better today almost pain free; CT noted no psudocyst  8/12: c/w abd pain 7/10; aferbrile  8/13: improved, pain 6/10, much better than yestreday  8/14: feels better; pain 3/10; no BP, NPO for many days now      PAST MEDICAL & SURGICAL HISTORY:  Alcohol dependence  Pancreatitis  PUD (peptic ulcer disease)  No significant past surgical history    Vital Signs Last 24 Hrs  T(C): 37.7 (14 Aug 2017 04:54), Max: 37.8 (13 Aug 2017 11:32)  T(F): 99.8 (14 Aug 2017 04:54), Max: 100.1 (13 Aug 2017 11:32)  HR: 73 (14 Aug 2017 04:54) (72 - 80)  BP: 115/63 (14 Aug 2017 04:54) (115/63 - 129/76)  BP(mean): --  RR: 18 (14 Aug 2017 04:54) (17 - 18)  SpO2: 97% (14 Aug 2017 04:54) (97% - 98%)    Constitutional: NAD    Head: NCAT    HEENT: anicteric    Neck: no abnormal lymphadenopathy    Respiratory: CTA BL    Cardiovascular:  RRR    Gastrointestinal: stable distension, decreased BS +epigastric tenderness    Extremities: no LE edema    Neuro: no focal deficits    Skin: no jaundice      LABS: reviewed                  A/P:     63 yo woman admitted with pancreatitis: persistent pain and fever      * Acute Pancreatitis sec to ETOH:   - start clears; still with low grade fever; BS are weak  - IVF; monitor temps; CT showed no collection while she was spiking fever  - outpt f/up pancreatic cyst    * ETOH abuse to f/up with CSW re: outpt rehab      * dvt px: LMWH    case d/w daughter; if chang diet poss dc in am; I am afraid she might have to advance v slowly, had a protracted course    - add MVI to the bag and d5    Case d/w daughter

## 2017-08-14 NOTE — PROGRESS NOTE ADULT - SUBJECTIVE AND OBJECTIVE BOX
HPI:  63 yo woman was admitted with acute pancreatitis. Drinks heavily. Had an episode of pancreatitis 23 years ago. Was having temp spikes, CT unchanged: no necrosis or fluid collections. Labs improved with hydration, lipase normal. Finally feeling better today. Abdominal pain improved. No n/v. Passing flatus, no BM today.       MEDICATIONS  (STANDING):  enoxaparin Injectable 40 milliGRAM(s) SubCutaneous every 24 hours  pantoprazole  Injectable 40 milliGRAM(s) IV Push daily  dextrose 5% + sodium chloride 0.9% 1000 milliLiter(s) (125 mL/Hr) IV Continuous <Continuous>    MEDICATIONS  (PRN):  ondansetron Injectable 4 milliGRAM(s) IV Push every 6 hours PRN Nausea  HYDROmorphone  Injectable 0.5 milliGRAM(s) IV Push every 4 hours PRN Moderate Pain (4 - 6)  HYDROmorphone  Injectable 1 milliGRAM(s) IV Push every 4 hours PRN Severe Pain (7 - 10)  acetaminophen   Tablet 650 milliGRAM(s) Oral every 6 hours PRN For Temp greater than 38.5 C (101.3 F)      Allergies    No Known Allergies    Intolerances        REVIEW OF SYSTEMS    General: low grade temps    HEENT: no icterus    Respiratory and Thorax: no SOB  	  Cardiovascular: no CP    Gastrointestinal: as above    Skin: no jaundice      Vital Signs Last 24 Hrs  T(C): 37.7 (14 Aug 2017 04:54), Max: 37.8 (13 Aug 2017 11:32)  T(F): 99.8 (14 Aug 2017 04:54), Max: 100.1 (13 Aug 2017 11:32)  HR: 73 (14 Aug 2017 04:54) (72 - 80)  BP: 115/63 (14 Aug 2017 04:54) (115/63 - 129/76)  BP(mean): --  RR: 18 (14 Aug 2017 04:54) (17 - 18)  SpO2: 97% (14 Aug 2017 04:54) (97% - 98%)    PHYSICAL EXAM:    Constitutional: NAD    HEENT: anicteric    Respiratory: CTA BL    Cardiovascular:  RRR    Gastrointestinal: soft ND +BS NTTP    Extremities: no LE edema    Neuro: no focal deficits    Skin: no jaundice      LABS:                RADIOLOGY & ADDITIONAL STUDIES:

## 2017-08-14 NOTE — PROGRESS NOTE ADULT - ASSESSMENT
61 yo woman admitted with pancreatitis; clinically improving.    -Clears today  -If doing well on PO, can DC IVF  -Wean pain management as tolerated  -Elevated liver tests due to ETOH hepatitis. No indication for steroids

## 2017-08-15 LAB
ADD ON TEST-SPECIMEN IN LAB: SIGNIFICANT CHANGE UP
ALBUMIN SERPL ELPH-MCNC: 2.1 G/DL — LOW (ref 3.3–5)
ALP SERPL-CCNC: 84 U/L — SIGNIFICANT CHANGE UP (ref 40–120)
ALT FLD-CCNC: 35 U/L — SIGNIFICANT CHANGE UP (ref 12–78)
ANION GAP SERPL CALC-SCNC: 8 MMOL/L — SIGNIFICANT CHANGE UP (ref 5–17)
AST SERPL-CCNC: 27 U/L — SIGNIFICANT CHANGE UP (ref 15–37)
BILIRUB SERPL-MCNC: 0.7 MG/DL — SIGNIFICANT CHANGE UP (ref 0.2–1.2)
BUN SERPL-MCNC: <1 MG/DL — LOW (ref 7–23)
CALCIUM SERPL-MCNC: 8.2 MG/DL — LOW (ref 8.5–10.1)
CHLORIDE SERPL-SCNC: 103 MMOL/L — SIGNIFICANT CHANGE UP (ref 96–108)
CO2 SERPL-SCNC: 29 MMOL/L — SIGNIFICANT CHANGE UP (ref 22–31)
CREAT SERPL-MCNC: 0.52 MG/DL — SIGNIFICANT CHANGE UP (ref 0.5–1.3)
GLUCOSE SERPL-MCNC: 124 MG/DL — HIGH (ref 70–99)
HCT VFR BLD CALC: 32 % — LOW (ref 34.5–45)
HGB BLD-MCNC: 10.6 G/DL — LOW (ref 11.5–15.5)
MAGNESIUM SERPL-MCNC: 1.8 MG/DL — SIGNIFICANT CHANGE UP (ref 1.6–2.6)
MCHC RBC-ENTMCNC: 32.8 PG — SIGNIFICANT CHANGE UP (ref 27–34)
MCHC RBC-ENTMCNC: 33.3 GM/DL — SIGNIFICANT CHANGE UP (ref 32–36)
MCV RBC AUTO: 98.5 FL — SIGNIFICANT CHANGE UP (ref 80–100)
PHOSPHATE SERPL-MCNC: 3.2 MG/DL — SIGNIFICANT CHANGE UP (ref 2.5–4.5)
PLATELET # BLD AUTO: 316 K/UL — SIGNIFICANT CHANGE UP (ref 150–400)
POTASSIUM SERPL-MCNC: 2.7 MMOL/L — CRITICAL LOW (ref 3.5–5.3)
POTASSIUM SERPL-SCNC: 2.7 MMOL/L — CRITICAL LOW (ref 3.5–5.3)
PROT SERPL-MCNC: 5.5 GM/DL — LOW (ref 6–8.3)
RBC # BLD: 3.25 M/UL — LOW (ref 3.8–5.2)
RBC # FLD: 11.5 % — SIGNIFICANT CHANGE UP (ref 10.3–14.5)
SODIUM SERPL-SCNC: 140 MMOL/L — SIGNIFICANT CHANGE UP (ref 135–145)
WBC # BLD: 7.8 K/UL — SIGNIFICANT CHANGE UP (ref 3.8–10.5)
WBC # FLD AUTO: 7.8 K/UL — SIGNIFICANT CHANGE UP (ref 3.8–10.5)

## 2017-08-15 RX ORDER — HYDROMORPHONE HYDROCHLORIDE 2 MG/ML
1 INJECTION INTRAMUSCULAR; INTRAVENOUS; SUBCUTANEOUS ONCE
Qty: 0 | Refills: 0 | Status: DISCONTINUED | OUTPATIENT
Start: 2017-08-15 | End: 2017-08-15

## 2017-08-15 RX ORDER — DIPHENHYDRAMINE HCL 50 MG
25 CAPSULE ORAL ONCE
Qty: 0 | Refills: 0 | Status: COMPLETED | OUTPATIENT
Start: 2017-08-15 | End: 2017-08-15

## 2017-08-15 RX ORDER — OXYCODONE AND ACETAMINOPHEN 5; 325 MG/1; MG/1
1 TABLET ORAL EVERY 6 HOURS
Qty: 0 | Refills: 0 | Status: DISCONTINUED | OUTPATIENT
Start: 2017-08-15 | End: 2017-08-16

## 2017-08-15 RX ORDER — DEXTROSE MONOHYDRATE, SODIUM CHLORIDE, AND POTASSIUM CHLORIDE 50; .745; 4.5 G/1000ML; G/1000ML; G/1000ML
1000 INJECTION, SOLUTION INTRAVENOUS
Qty: 0 | Refills: 0 | Status: DISCONTINUED | OUTPATIENT
Start: 2017-08-15 | End: 2017-08-16

## 2017-08-15 RX ORDER — POTASSIUM CHLORIDE 20 MEQ
40 PACKET (EA) ORAL EVERY 4 HOURS
Qty: 0 | Refills: 0 | Status: COMPLETED | OUTPATIENT
Start: 2017-08-15 | End: 2017-08-15

## 2017-08-15 RX ADMIN — HYDROMORPHONE HYDROCHLORIDE 1 MILLIGRAM(S): 2 INJECTION INTRAMUSCULAR; INTRAVENOUS; SUBCUTANEOUS at 05:05

## 2017-08-15 RX ADMIN — Medication 40 MILLIEQUIVALENT(S): at 09:05

## 2017-08-15 RX ADMIN — Medication 25 MILLIGRAM(S): at 21:36

## 2017-08-15 RX ADMIN — DEXTROSE MONOHYDRATE, SODIUM CHLORIDE, AND POTASSIUM CHLORIDE 75 MILLILITER(S): 50; .745; 4.5 INJECTION, SOLUTION INTRAVENOUS at 09:05

## 2017-08-15 RX ADMIN — PANTOPRAZOLE SODIUM 40 MILLIGRAM(S): 20 TABLET, DELAYED RELEASE ORAL at 11:21

## 2017-08-15 RX ADMIN — Medication 40 MILLIEQUIVALENT(S): at 18:09

## 2017-08-15 RX ADMIN — ENOXAPARIN SODIUM 40 MILLIGRAM(S): 100 INJECTION SUBCUTANEOUS at 21:36

## 2017-08-15 RX ADMIN — Medication 40 MILLIEQUIVALENT(S): at 13:20

## 2017-08-15 RX ADMIN — OXYCODONE AND ACETAMINOPHEN 1 TABLET(S): 5; 325 TABLET ORAL at 18:08

## 2017-08-15 RX ADMIN — SODIUM CHLORIDE 125 MILLILITER(S): 9 INJECTION, SOLUTION INTRAVENOUS at 07:05

## 2017-08-15 NOTE — PROGRESS NOTE ADULT - ASSESSMENT
61 yo woman admitted with pancreatitis; clinically improving.    -Clears today; if not able to tolerate, should have nasojejunal tube placed for enteral feeding. Discussed this option with patient. She wants to try clears one more time today.  -Wean pain management as tolerated  -Trend labs

## 2017-08-15 NOTE — PROGRESS NOTE ADULT - SUBJECTIVE AND OBJECTIVE BOX
HPI:  63 yo woman was admitted with acute pancreatitis. Drinks heavily. Had an episode of pancreatitis 23 years ago. Was having temp spikes, CT unchanged: no necrosis or fluid collections. Labs improved with hydration, lipase normal. Felt better yesterday, but had pain with clears. CT repeated, slight increase in peripancreatic inflammatory changes/fluid. No necrosis. Patient says she always feels better in the morning. Wants to try clears again today. No n/v. No fever.     PAST MEDICAL & SURGICAL HISTORY:  Alcohol dependence  Pancreatitis  PUD (peptic ulcer disease)  No significant past surgical history      MEDICATIONS  (STANDING):  enoxaparin Injectable 40 milliGRAM(s) SubCutaneous every 24 hours  pantoprazole  Injectable 40 milliGRAM(s) IV Push daily  sodium chloride 0.9% with potassium chloride 20 mEq/L 1000 milliLiter(s) (75 mL/Hr) IV Continuous <Continuous>  potassium chloride    Tablet ER 40 milliEquivalent(s) Oral every 4 hours    MEDICATIONS  (PRN):  ondansetron Injectable 4 milliGRAM(s) IV Push every 6 hours PRN Nausea  acetaminophen   Tablet 650 milliGRAM(s) Oral every 6 hours PRN For Temp greater than 38.5 C (101.3 F)      Allergies    No Known Allergies    Intolerances        SOCIAL HISTORY:  No smoking, social alcohol use, no recreational drug use    FAMILY HISTORY:      REVIEW OF SYSTEMS    General: no fever    HEENT: no icterus    Respiratory and Thorax: no SOB  	  Cardiovascular: no CP    Gastrointestinal: as above    : no dysuria    Musculoskeletal: no myalgias    Skin: no jaundice    Neuro: no headaches or dizziness    Vital Signs Last 24 Hrs  T(C): 36.9 (15 Aug 2017 05:11), Max: 37.5 (14 Aug 2017 12:07)  T(F): 98.5 (15 Aug 2017 05:11), Max: 99.5 (14 Aug 2017 12:07)  HR: 70 (15 Aug 2017 05:11) (65 - 72)  BP: 107/57 (15 Aug 2017 05:11) (107/57 - 135/60)  BP(mean): --  RR: 18 (15 Aug 2017 05:11) (18 - 18)  SpO2: 97% (15 Aug 2017 05:11) (97% - 99%)    PHYSICAL EXAM:    Constitutional: NAD    Head: NCAT    HEENT: anicteric    Neck: no abnormal lymphadenopathy    Respiratory: CTA BL    Cardiovascular:  RRR    Gastrointestinal: soft ND +BS NTTP    Extremities: no LE edema    Neuro: no focal deficits    Skin: no jaundice      LABS:                        10.6   7.8   )-----------( 316      ( 15 Aug 2017 06:49 )             32.0     08-15    140  |  103  |  <1<L>  ----------------------------<  124<H>  2.7<LL>   |  29  |  0.52    Ca    8.2<L>      15 Aug 2017 06:49  Phos  3.2     08-15  Mg     1.8     08-15    TPro  5.5<L>  /  Alb  2.1<L>  /  TBili  0.7  /  DBili  x   /  AST  27  /  ALT  35  /  AlkPhos  84  08-15      LIVER FUNCTIONS - ( 15 Aug 2017 06:49 )  Alb: 2.1 g/dL / Pro: 5.5 gm/dL / ALK PHOS: 84 U/L / ALT: 35 U/L / AST: 27 U/L / GGT: x             RADIOLOGY & ADDITIONAL STUDIES:

## 2017-08-15 NOTE — PROGRESS NOTE ADULT - SUBJECTIVE AND OBJECTIVE BOX
HPI:  61 yo woman was admitted with acute pancreatitis. Drinks heavily. Had an episode of pancreatitis 23 years ago.     8/10: feels better today almost pain free; CT noted no psudocyst  8/12: c/w abd pain 7/10; aferbrile  8/13: improved, pain 6/10, much better than yestreday  8/14: feels better; pain 3/10; no BP, NPO for many days now    8/15- tolerated clears for breakfast.  Mild abd pain.  No n/v/d.     ROS- as per hpi      PAST MEDICAL & SURGICAL HISTORY:  Alcohol dependence  Pancreatitis  PUD (peptic ulcer disease)  No significant past surgical history    Vital Signs Last 24 Hrs  T(C): 36.9 (15 Aug 2017 05:11), Max: 37.5 (14 Aug 2017 12:07)  T(F): 98.5 (15 Aug 2017 05:11), Max: 99.5 (14 Aug 2017 12:07)  HR: 70 (15 Aug 2017 05:11) (65 - 72)  BP: 107/57 (15 Aug 2017 05:11) (107/57 - 135/60)  BP(mean): --  RR: 18 (15 Aug 2017 05:11) (18 - 18)  SpO2: 97% (15 Aug 2017 05:11) (97% - 99%)        PHYSICAL EXAM:    General: NAD, comfortable  Neuro: AAOx3, no focal deficits  HEENT: NCAT, PERRLA, EOMI,   Neck: Soft and supple, No JVD  Respiratory: CTA b/l, no w/r/r  Cardiovascular: S1 and S2, RRR, no m/g/r  Gastrointestinal: +BS, soft, NTND, no rebound/guarding  Extremities: No c/c/e  Vascular: 2+ peripheral pulses  Musculoskeletal: 5/5 strength b/l UE and LE, sensation intact  Skin: No rashes        LABS: All Labs Reviewed:                        10.6   7.8   )-----------( 316      ( 15 Aug 2017 06:49 )             32.0     08-15    140  |  103  |  <1<L>  ----------------------------<  124<H>  2.7<LL>   |  29  |  0.52    Ca    8.2<L>      15 Aug 2017 06:49  Phos  3.2     08-15  Mg     1.8     08-15    TPro  5.5<L>  /  Alb  2.1<L>  /  TBili  0.7  /  DBili  x   /  AST  27  /  ALT  35  /  AlkPhos  84  08-15          < from: CT Abdomen and Pelvis w/ Oral Cont and w/ IV Cont (08.14.17 @ 22:53) >    IMPRESSION: Slightly increased peripancreatic inflammation since   8/9/2017. Developing 2 cm focus of loculated fluid in the left anterior   pararenal space. Small bilateral pleural effusions. Modified CT severity   index 6 out of 10.    < end of copied text >    MEDICATIONS  (STANDING):  enoxaparin Injectable 40 milliGRAM(s) SubCutaneous every 24 hours  pantoprazole  Injectable 40 milliGRAM(s) IV Push daily  sodium chloride 0.9% with potassium chloride 20 mEq/L 1000 milliLiter(s) (75 mL/Hr) IV Continuous <Continuous>  potassium chloride    Tablet ER 40 milliEquivalent(s) Oral every 4 hours    MEDICATIONS  (PRN):  ondansetron Injectable 4 milliGRAM(s) IV Push every 6 hours PRN Nausea  acetaminophen   Tablet 650 milliGRAM(s) Oral every 6 hours PRN For Temp greater than 38.5 C (101.3 F)      ASSESSMENT and PLAN:       62y female admitted w/     *acute pancreatitis, etoh hepatitis  - fevers improving, lfts, lipase now normal  - tolerating clears today, continue   - etoh cessation discussed  - GI f/u appreciated    * hypokalemia  - due to decreased po intake  - normal mag  - replete IV, po and repeat bmp       * dvt px: LMWH

## 2017-08-16 LAB
ALBUMIN SERPL ELPH-MCNC: 2.2 G/DL — LOW (ref 3.3–5)
ALP SERPL-CCNC: 77 U/L — SIGNIFICANT CHANGE UP (ref 40–120)
ALT FLD-CCNC: 32 U/L — SIGNIFICANT CHANGE UP (ref 12–78)
ANION GAP SERPL CALC-SCNC: 9 MMOL/L — SIGNIFICANT CHANGE UP (ref 5–17)
AST SERPL-CCNC: 21 U/L — SIGNIFICANT CHANGE UP (ref 15–37)
BILIRUB SERPL-MCNC: 0.6 MG/DL — SIGNIFICANT CHANGE UP (ref 0.2–1.2)
BUN SERPL-MCNC: <1 MG/DL — LOW (ref 7–23)
CALCIUM SERPL-MCNC: 8.3 MG/DL — LOW (ref 8.5–10.1)
CHLORIDE SERPL-SCNC: 106 MMOL/L — SIGNIFICANT CHANGE UP (ref 96–108)
CO2 SERPL-SCNC: 26 MMOL/L — SIGNIFICANT CHANGE UP (ref 22–31)
CREAT SERPL-MCNC: 0.52 MG/DL — SIGNIFICANT CHANGE UP (ref 0.5–1.3)
GLUCOSE SERPL-MCNC: 96 MG/DL — SIGNIFICANT CHANGE UP (ref 70–99)
HCT VFR BLD CALC: 32.6 % — LOW (ref 34.5–45)
HGB BLD-MCNC: 10.8 G/DL — LOW (ref 11.5–15.5)
MCHC RBC-ENTMCNC: 32.9 PG — SIGNIFICANT CHANGE UP (ref 27–34)
MCHC RBC-ENTMCNC: 33.1 GM/DL — SIGNIFICANT CHANGE UP (ref 32–36)
MCV RBC AUTO: 99.4 FL — SIGNIFICANT CHANGE UP (ref 80–100)
PLATELET # BLD AUTO: 374 K/UL — SIGNIFICANT CHANGE UP (ref 150–400)
POTASSIUM SERPL-MCNC: 3.3 MMOL/L — LOW (ref 3.5–5.3)
POTASSIUM SERPL-SCNC: 3.3 MMOL/L — LOW (ref 3.5–5.3)
PROT SERPL-MCNC: 5.8 GM/DL — LOW (ref 6–8.3)
RBC # BLD: 3.28 M/UL — LOW (ref 3.8–5.2)
RBC # FLD: 11.5 % — SIGNIFICANT CHANGE UP (ref 10.3–14.5)
SODIUM SERPL-SCNC: 141 MMOL/L — SIGNIFICANT CHANGE UP (ref 135–145)
WBC # BLD: 6.3 K/UL — SIGNIFICANT CHANGE UP (ref 3.8–10.5)
WBC # FLD AUTO: 6.3 K/UL — SIGNIFICANT CHANGE UP (ref 3.8–10.5)

## 2017-08-16 RX ORDER — PANTOPRAZOLE SODIUM 20 MG/1
40 TABLET, DELAYED RELEASE ORAL
Qty: 0 | Refills: 0 | Status: DISCONTINUED | OUTPATIENT
Start: 2017-08-16 | End: 2017-08-17

## 2017-08-16 RX ORDER — ZOLPIDEM TARTRATE 10 MG/1
5 TABLET ORAL AT BEDTIME
Qty: 0 | Refills: 0 | Status: DISCONTINUED | OUTPATIENT
Start: 2017-08-16 | End: 2017-08-17

## 2017-08-16 RX ORDER — POTASSIUM CHLORIDE 20 MEQ
40 PACKET (EA) ORAL ONCE
Qty: 0 | Refills: 0 | Status: COMPLETED | OUTPATIENT
Start: 2017-08-16 | End: 2017-08-16

## 2017-08-16 RX ADMIN — ZOLPIDEM TARTRATE 5 MILLIGRAM(S): 10 TABLET ORAL at 21:07

## 2017-08-16 RX ADMIN — PANTOPRAZOLE SODIUM 40 MILLIGRAM(S): 20 TABLET, DELAYED RELEASE ORAL at 12:59

## 2017-08-16 RX ADMIN — DEXTROSE MONOHYDRATE, SODIUM CHLORIDE, AND POTASSIUM CHLORIDE 75 MILLILITER(S): 50; .745; 4.5 INJECTION, SOLUTION INTRAVENOUS at 13:30

## 2017-08-16 RX ADMIN — ENOXAPARIN SODIUM 40 MILLIGRAM(S): 100 INJECTION SUBCUTANEOUS at 21:07

## 2017-08-16 RX ADMIN — Medication 40 MILLIEQUIVALENT(S): at 12:59

## 2017-08-16 NOTE — PROGRESS NOTE ADULT - ASSESSMENT
61 yo woman admitted with pancreatitis; clinically improving.    -Full liquids and advance diet as tolerated to low fat  -Wean pain medication  -Trend labs

## 2017-08-16 NOTE — PROGRESS NOTE ADULT - SUBJECTIVE AND OBJECTIVE BOX
HPI:    63 yo woman was admitted with acute pancreatitis. Drinks heavily. Had an episode of pancreatitis 23 years ago. Was having temp spikes, CT unchanged: no necrosis or fluid collections. Labs improved with hydration, lipase normal. Felt better yesterday, but had pain with clears. CT repeated, slight increase in peripancreatic inflammatory changes/fluid. No necrosis. Feeling better. Tolerating clears, wants to advance diet. Pain improved, weaning pain medication. No n/v. No fever.    MEDICATIONS  (STANDING):  enoxaparin Injectable 40 milliGRAM(s) SubCutaneous every 24 hours  pantoprazole  Injectable 40 milliGRAM(s) IV Push daily  sodium chloride 0.9% with potassium chloride 20 mEq/L 1000 milliLiter(s) (75 mL/Hr) IV Continuous <Continuous>    MEDICATIONS  (PRN):  ondansetron Injectable 4 milliGRAM(s) IV Push every 6 hours PRN Nausea  acetaminophen   Tablet 650 milliGRAM(s) Oral every 6 hours PRN For Temp greater than 38.5 C (101.3 F)  oxyCODONE    5 mG/acetaminophen 325 mG 1 Tablet(s) Oral every 6 hours PRN Severe Pain (7 - 10)      Allergies    No Known Allergies    Intolerances        REVIEW OF SYSTEMS    General: no fever    HEENT: no icterus    Respiratory and Thorax: no SOB  	  Cardiovascular: no CP    Gastrointestinal: as above    Skin: no jaundice      Vital Signs Last 24 Hrs  T(C): 36.9 (16 Aug 2017 05:33), Max: 36.9 (16 Aug 2017 05:33)  T(F): 98.5 (16 Aug 2017 05:33), Max: 98.5 (16 Aug 2017 05:33)  HR: 63 (16 Aug 2017 05:33) (63 - 78)  BP: 124/67 (16 Aug 2017 05:33) (107/59 - 124/67)  BP(mean): --  RR: 18 (15 Aug 2017 16:53) (16 - 18)  SpO2: 98% (16 Aug 2017 05:33) (98% - 98%)    PHYSICAL EXAM:    Constitutional: NAD    HEENT: anicteric    Respiratory: CTA BL    Cardiovascular:  RRR    Gastrointestinal: soft ND +BS NTTP    Extremities: no LE edema    Neuro: no focal deficits    Skin: no jaundice      LABS:                        10.8   6.3   )-----------( 374      ( 16 Aug 2017 07:18 )             32.6     08-16    141  |  106  |  <1<L>  ----------------------------<  96  3.3<L>   |  26  |  0.52    Ca    8.3<L>      16 Aug 2017 07:18  Phos  3.2     08-15  Mg     1.8     08-15    TPro  5.8<L>  /  Alb  2.2<L>  /  TBili  0.6  /  DBili  x   /  AST  21  /  ALT  32  /  AlkPhos  77  08-16      LIVER FUNCTIONS - ( 16 Aug 2017 07:18 )  Alb: 2.2 g/dL / Pro: 5.8 gm/dL / ALK PHOS: 77 U/L / ALT: 32 U/L / AST: 21 U/L / GGT: x             RADIOLOGY & ADDITIONAL STUDIES:

## 2017-08-16 NOTE — PROGRESS NOTE ADULT - SUBJECTIVE AND OBJECTIVE BOX
HPI:  61 yo woman was admitted with acute pancreatitis. Drinks heavily. Had an episode of pancreatitis 23 years ago.     8/10: feels better today almost pain free; CT noted no psudocyst  8/12: c/w abd pain 7/10; aferbrile  8/13: improved, pain 6/10, much better than yestreday  8/14: feels better; pain 3/10; no BP, NPO for many days now    8/15- tolerated clears for breakfast.  Mild abd pain.  No n/v/d.   8/16- comfortable today, no pain, tolerated full liquids for breakfast.  No n/v/d.     ROS- as per hpi      PAST MEDICAL & SURGICAL HISTORY:  Alcohol dependence  Pancreatitis  PUD (peptic ulcer disease)  No significant past surgical history    Vital Signs Last 24 Hrs  T(C): 36.8 (16 Aug 2017 11:37), Max: 36.9 (16 Aug 2017 05:33)  T(F): 98.2 (16 Aug 2017 11:37), Max: 98.5 (16 Aug 2017 05:33)  HR: 77 (16 Aug 2017 11:37) (63 - 78)  BP: 118/67 (16 Aug 2017 11:37) (118/67 - 124/67)  BP(mean): --  RR: 16 (16 Aug 2017 11:37) (16 - 18)  SpO2: 99% (16 Aug 2017 11:37) (98% - 99%)  T(C): 36.9 (15 Aug 2017 05:11), Max: 37.5 (14 Aug 2017 12:07)  T(F): 98.5 (15 Aug 2017 05:11), Max: 99.5 (14 Aug 2017 12:07)  HR: 70 (15 Aug 2017 05:11) (65 - 72)  BP: 107/57 (15 Aug 2017 05:11) (107/57 - 135/60)  BP(mean): --  RR: 18 (15 Aug 2017 05:11) (18 - 18)  SpO2: 97% (15 Aug 2017 05:11) (97% - 99%)        PHYSICAL EXAM:    General: NAD, comfortable  Neuro: AAOx3, no focal deficits  HEENT: NCAT, PERRLA, EOMI,   Neck: Soft and supple, No JVD  Respiratory: CTA b/l, no w/r/r  Cardiovascular: S1 and S2, RRR, no m/g/r  Gastrointestinal: +BS, soft, NTND, no rebound/guarding  Extremities: No c/c/e  Vascular: 2+ peripheral pulses  Musculoskeletal: 5/5 strength b/l UE and LE, sensation intact  Skin: No rashes          LABS: All Labs Reviewed:                        10.8   6.3   )-----------( 374      ( 16 Aug 2017 07:18 )             32.6     08-16    141  |  106  |  <1<L>  ----------------------------<  96  3.3<L>   |  26  |  0.52    Ca    8.3<L>      16 Aug 2017 07:18  Phos  3.2     08-15  Mg     1.8     08-15    TPro  5.8<L>  /  Alb  2.2<L>  /  TBili  0.6  /  DBili  x   /  AST  21  /  ALT  32  /  AlkPhos  77  08-16              Blood Culture:     RADIOLOGY/EKG:                                  10.6   7.8   )-----------( 316      ( 15 Aug 2017 06:49 )             32.0     08-15    140  |  103  |  <1<L>  ----------------------------<  124<H>  2.7<LL>   |  29  |  0.52    Ca    8.2<L>      15 Aug 2017 06:49  Phos  3.2     08-15  Mg     1.8     08-15    TPro  5.5<L>  /  Alb  2.1<L>  /  TBili  0.7  /  DBili  x   /  AST  27  /  ALT  35  /  AlkPhos  84  08-15          < from: CT Abdomen and Pelvis w/ Oral Cont and w/ IV Cont (08.14.17 @ 22:53) >    IMPRESSION: Slightly increased peripancreatic inflammation since   8/9/2017. Developing 2 cm focus of loculated fluid in the left anterior   pararenal space. Small bilateral pleural effusions. Modified CT severity   index 6 out of 10.    < end of copied text >    MEDICATIONS  (STANDING):  enoxaparin Injectable 40 milliGRAM(s) SubCutaneous every 24 hours  pantoprazole  Injectable 40 milliGRAM(s) IV Push daily  sodium chloride 0.9% with potassium chloride 20 mEq/L 1000 milliLiter(s) (75 mL/Hr) IV Continuous <Continuous>  potassium chloride    Tablet ER 40 milliEquivalent(s) Oral every 4 hours    MEDICATIONS  (PRN):  ondansetron Injectable 4 milliGRAM(s) IV Push every 6 hours PRN Nausea  acetaminophen   Tablet 650 milliGRAM(s) Oral every 6 hours PRN For Temp greater than 38.5 C (101.3 F)      ASSESSMENT and PLAN:       62y female admitted w/     *acute pancreatitis, etoh hepatitis  - fevers improving, lfts normal   - tolerating clears --> changed to full  - etoh cessation discussed  - GI f/u appreciated    * hypokalemia  - due to decreased po intake  - normal mag  - replete IV, po and repeat bmp       * dvt px: LMWH

## 2017-08-17 ENCOUNTER — TRANSCRIPTION ENCOUNTER (OUTPATIENT)
Age: 62
End: 2017-08-17

## 2017-08-17 RX ADMIN — PANTOPRAZOLE SODIUM 40 MILLIGRAM(S): 20 TABLET, DELAYED RELEASE ORAL at 05:57

## 2017-08-17 NOTE — DISCHARGE NOTE ADULT - CARE PLAN
Principal Discharge DX:	Acute pancreatitis  Goal:	NO alcohol  Instructions for follow-up, activity and diet:	low fat diet, no alcohol.  Establish care with a primary physician at [facility] next week.  follow up with Dr. Jeffery in 2-3 weeks. Principal Discharge DX:	Acute pancreatitis  Goal:	NO alcohol  Instructions for follow-up, activity and diet:	low fat diet, no alcohol.  Establish care with a primary physician at [facility] next week.  follow up with Dr. Jeffery in 2-3 weeks.

## 2017-08-17 NOTE — PROGRESS NOTE ADULT - SUBJECTIVE AND OBJECTIVE BOX
HPI:  61 yo woman was admitted with acute pancreatitis. Drinks heavily. Had an episode of pancreatitis 23 years ago.     8/10: feels better today almost pain free; CT noted no psudocyst  8/12: c/w abd pain 7/10; aferbrile  8/13: improved, pain 6/10, much better than yestreday  8/14: feels better; pain 3/10; no BP, NPO for many days now    8/15- tolerated clears for breakfast.  Mild abd pain.  No n/v/d.   8/16- comfortable today, no pain, tolerated full liquids for breakfast.  No n/v/d.   8/17- feeling better, tolerating diet.  Stable for d/c.      ROS- as per hpi      PAST MEDICAL & SURGICAL HISTORY:  Alcohol dependence  Pancreatitis  PUD (peptic ulcer disease)  No significant past surgical history    Vital Signs Last 24 Hrs  Vital Signs Last 24 Hrs  T(C): 37.3 (17 Aug 2017 05:22), Max: 37.3 (17 Aug 2017 05:22)  T(F): 99.2 (17 Aug 2017 05:22), Max: 99.2 (17 Aug 2017 05:22)  HR: 73 (17 Aug 2017 05:22) (73 - 77)  BP: 130/81 (17 Aug 2017 05:22) (118/67 - 135/76)  BP(mean): --  RR: 18 (17 Aug 2017 05:22) (16 - 18)  SpO2: 99% (17 Aug 2017 05:22) (99% - 100%)      PHYSICAL EXAM:    General: NAD, comfortable  Neuro: AAOx3, no focal deficits  HEENT: NCAT, PERRLA, EOMI,   Neck: Soft and supple, No JVD  Respiratory: CTA b/l, no w/r/r  Cardiovascular: S1 and S2, RRR, no m/g/r  Gastrointestinal: +BS, soft, NTND, no rebound/guarding  Extremities: No c/c/e  Vascular: 2+ peripheral pulses  Musculoskeletal: 5/5 strength b/l UE and LE, sensation intact  Skin: No rashes            LABS: All Labs Reviewed:                        10.8   6.3   )-----------( 374      ( 16 Aug 2017 07:18 )             32.6     08-16    141  |  106  |  <1<L>  ----------------------------<  96  3.3<L>   |  26  |  0.52    Ca    8.3<L>      16 Aug 2017 07:18    TPro  5.8<L>  /  Alb  2.2<L>  /  TBili  0.6  /  DBili  x   /  AST  21  /  ALT  32  /  AlkPhos  77  08-16              Blood Culture:     RADIOLOGY/EKG:                                  10.8   6.3   )-----------( 374      ( 16 Aug 2017 07:18 )             32.6     08-16    141  |  106  |  <1<L>  ----------------------------<  96  3.3<L>   |  26  |  0.52    Ca    8.3<L>      16 Aug 2017 07:18  Phos  3.2     08-15  Mg     1.8     08-15    TPro  5.8<L>  /  Alb  2.2<L>  /  TBili  0.6  /  DBili  x   /  AST  21  /  ALT  32  /  AlkPhos  77  08-16              Blood Culture:     RADIOLOGY/EKG:                                  10.6   7.8   )-----------( 316      ( 15 Aug 2017 06:49 )             32.0     08-15    140  |  103  |  <1<L>  ----------------------------<  124<H>  2.7<LL>   |  29  |  0.52    Ca    8.2<L>      15 Aug 2017 06:49  Phos  3.2     08-15  Mg     1.8     08-15    TPro  5.5<L>  /  Alb  2.1<L>  /  TBili  0.7  /  DBili  x   /  AST  27  /  ALT  35  /  AlkPhos  84  08-15          < from: CT Abdomen and Pelvis w/ Oral Cont and w/ IV Cont (08.14.17 @ 22:53) >    IMPRESSION: Slightly increased peripancreatic inflammation since   8/9/2017. Developing 2 cm focus of loculated fluid in the left anterior   pararenal space. Small bilateral pleural effusions. Modified CT severity   index 6 out of 10.    < end of copied text >    MEDICATIONS  (STANDING):  enoxaparin Injectable 40 milliGRAM(s) SubCutaneous every 24 hours  pantoprazole  Injectable 40 milliGRAM(s) IV Push daily  sodium chloride 0.9% with potassium chloride 20 mEq/L 1000 milliLiter(s) (75 mL/Hr) IV Continuous <Continuous>  potassium chloride    Tablet ER 40 milliEquivalent(s) Oral every 4 hours    MEDICATIONS  (PRN):  ondansetron Injectable 4 milliGRAM(s) IV Push every 6 hours PRN Nausea  acetaminophen   Tablet 650 milliGRAM(s) Oral every 6 hours PRN For Temp greater than 38.5 C (101.3 F)      ASSESSMENT and PLAN:       62y female admitted w/     *acute pancreatitis, etoh hepatitis  - fevers improving, lfts normal   - tolerating clears --> changed to full  - etoh cessation discussed  - GI f/u appreciated- f/u outpt in 2-3 weeks.     * hypokalemia  - due to decreased po intake  - normal mag  - replete IV, po and repeat bmp       * dvt px: LMWH    Time to d/c >55min.  Pt will f/u at [facility]- doesn't have pmd.

## 2017-08-17 NOTE — DISCHARGE NOTE ADULT - PLAN OF CARE
NO alcohol low fat diet, no alcohol.  Establish care with a primary physician at [facility] next week.  follow up with Dr. Jeffery in 2-3 weeks.

## 2017-08-17 NOTE — DISCHARGE NOTE ADULT - CARE PROVIDERS DIRECT ADDRESSES
[email or web address]

## 2017-08-17 NOTE — DISCHARGE NOTE ADULT - HOSPITAL COURSE
azole  Injectable 40 milliGRAM(s) IV Push daily  sodium chloride 0.9% with potassium chloride 20 mEq/L 1000 milliLiter(s) (75 mL/Hr) IV Continuous <Continuous>  potassium chloride    Tablet ER 40 milliEquivalent(s) Oral every 4 hours    MEDICATIONS  (PRN):  ondansetron Injectable 4 milliGRAM(s) IV Push every 6 hours PRN Nausea  acetaminophen   Tablet 650 milliGRAM(s) Oral every 6 hours PRN For Temp greater than 38.5 C (101.3 F)      ASSESSMENT and PLAN:       62y female admitted w/     *acute pancreatitis, etoh hepatitis  - fevers improving, lfts normal   - tolerating clears --> changed to full  - etoh cessation discussed  - GI f/u appreciated- f/u outpt in 2-3 weeks.     * hypokalemia  - due to decreased po intake  - normal mag  - replete IV, po and repeat bmp       * dvt px: LMWH    Time to d/c >55min.  Pt will f/u at [facility]- doesn't have pmd.

## 2017-08-17 NOTE — DISCHARGE NOTE ADULT - PATIENT PORTAL LINK FT
“You can access the [facility] Patient Portal, offered by [facility], by registering with the following website: [email or web address]”

## 2017-08-17 NOTE — DISCHARGE NOTE ADULT - CARE PROVIDER_API CALL
Margaret Jeffery (DO), Gastroenterology; Internal Medicine  [address] [place] [address]  Phone: [phone]  Fax: [phone]

## 2017-08-17 NOTE — PROGRESS NOTE ADULT - SUBJECTIVE AND OBJECTIVE BOX
HPI:    61 yo woman was admitted with acute pancreatitis. Drinks heavily. Had an episode of pancreatitis 23 years ago. Was having temp spikes, CT unchanged: no necrosis or fluid collections. Labs improved with hydration, lipase normal. Felt better yesterday, but had pain with clears. CT repeated, slight increase in peripancreatic inflammatory changes/fluid. No necrosis. Feeling better. Tolerating full liquids, wants to advance diet. Pain resolved. No n/v. No fever.    MEDICATIONS  (STANDING):  enoxaparin Injectable 40 milliGRAM(s) SubCutaneous every 24 hours  pantoprazole    Tablet 40 milliGRAM(s) Oral before breakfast    MEDICATIONS  (PRN):  ondansetron Injectable 4 milliGRAM(s) IV Push every 6 hours PRN Nausea  acetaminophen   Tablet 650 milliGRAM(s) Oral every 6 hours PRN For Temp greater than 38.5 C (101.3 F)  zolpidem 5 milliGRAM(s) Oral at bedtime PRN Insomnia      Allergies    No Known Allergies    Intolerances        REVIEW OF SYSTEMS    General: no fever    HEENT: no icterus    Respiratory and Thorax: no SOB  	  Cardiovascular: no CP    Gastrointestinal: as above    Skin: no jaundice      Vital Signs Last 24 Hrs  T(C): 37.3 (17 Aug 2017 05:22), Max: 37.3 (17 Aug 2017 05:22)  T(F): 99.2 (17 Aug 2017 05:22), Max: 99.2 (17 Aug 2017 05:22)  HR: 73 (17 Aug 2017 05:22) (73 - 77)  BP: 130/81 (17 Aug 2017 05:22) (118/67 - 135/76)  BP(mean): --  RR: 18 (17 Aug 2017 05:22) (16 - 18)  SpO2: 99% (17 Aug 2017 05:22) (99% - 100%)    PHYSICAL EXAM:    Constitutional: NAD    HEENT: anicteric    Respiratory: CTA BL    Cardiovascular:  RRR    Gastrointestinal: soft ND +BS NTTP    Extremities: no LE edema    Neuro: no focal deficits    Skin: no jaundice      LABS:                        10.8   6.3   )-----------( 374      ( 16 Aug 2017 07:18 )             32.6     08-16    141  |  106  |  <1<L>  ----------------------------<  96  3.3<L>   |  26  |  0.52    Ca    8.3<L>      16 Aug 2017 07:18    TPro  5.8<L>  /  Alb  2.2<L>  /  TBili  0.6  /  DBili  x   /  AST  21  /  ALT  32  /  AlkPhos  77  08-16      LIVER FUNCTIONS - ( 16 Aug 2017 07:18 )  Alb: 2.2 g/dL / Pro: 5.8 gm/dL / ALK PHOS: 77 U/L / ALT: 32 U/L / AST: 21 U/L / GGT: x             RADIOLOGY & ADDITIONAL STUDIES:

## 2017-08-17 NOTE — PROGRESS NOTE ADULT - ASSESSMENT
61 yo woman admitted with pancreatitis; clinically improving.    -Low fat diet   -Trend labs  -ETOH cessation  -F/U with me as outpt in 2-3 weeks [phone]

## 2017-08-18 VITALS
SYSTOLIC BLOOD PRESSURE: 145 MMHG | HEART RATE: 66 BPM | DIASTOLIC BLOOD PRESSURE: 73 MMHG | OXYGEN SATURATION: 97 % | TEMPERATURE: 99 F | RESPIRATION RATE: 16 BRPM

## 2017-08-22 DIAGNOSIS — F10.239 ALCOHOL DEPENDENCE WITH WITHDRAWAL, UNSPECIFIED: ICD-10-CM

## 2017-08-22 DIAGNOSIS — K85.90 ACUTE PANCREATITIS WITHOUT NECROSIS OR INFECTION, UNSPECIFIED: ICD-10-CM

## 2017-08-22 DIAGNOSIS — E87.6 HYPOKALEMIA: ICD-10-CM

## 2017-08-22 DIAGNOSIS — E83.42 HYPOMAGNESEMIA: ICD-10-CM

## 2017-08-22 DIAGNOSIS — K85.20 ALCOHOL INDUCED ACUTE PANCREATITIS WITHOUT NECROSIS OR INFECTION: ICD-10-CM

## 2017-08-22 DIAGNOSIS — E83.39 OTHER DISORDERS OF PHOSPHORUS METABOLISM: ICD-10-CM

## 2017-08-22 DIAGNOSIS — K70.10 ALCOHOLIC HEPATITIS WITHOUT ASCITES: ICD-10-CM

## 2017-08-31 PROBLEM — K27.9 PEPTIC ULCER, SITE UNSPECIFIED, UNSPECIFIED AS ACUTE OR CHRONIC, WITHOUT HEMORRHAGE OR PERFORATION: Chronic | Status: ACTIVE | Noted: 2017-08-07

## 2017-08-31 PROBLEM — K85.90 ACUTE PANCREATITIS WITHOUT NECROSIS OR INFECTION, UNSPECIFIED: Chronic | Status: ACTIVE | Noted: 2017-08-07

## 2017-09-05 ENCOUNTER — APPOINTMENT (OUTPATIENT)
Dept: MRI IMAGING | Facility: CLINIC | Age: 62
End: 2017-09-05
Payer: MEDICAID

## 2017-09-05 ENCOUNTER — OUTPATIENT (OUTPATIENT)
Dept: OUTPATIENT SERVICES | Facility: HOSPITAL | Age: 62
LOS: 1 days | End: 2017-09-05
Payer: MEDICAID

## 2017-09-05 DIAGNOSIS — Z00.8 ENCOUNTER FOR OTHER GENERAL EXAMINATION: ICD-10-CM

## 2017-09-05 PROCEDURE — 73221 MRI JOINT UPR EXTREM W/O DYE: CPT

## 2017-09-05 PROCEDURE — 73221 MRI JOINT UPR EXTREM W/O DYE: CPT | Mod: 26,RT

## 2017-12-05 ENCOUNTER — OUTPATIENT (OUTPATIENT)
Dept: OUTPATIENT SERVICES | Facility: HOSPITAL | Age: 62
LOS: 1 days | End: 2017-12-05
Payer: MEDICAID

## 2017-12-05 ENCOUNTER — APPOINTMENT (OUTPATIENT)
Dept: MRI IMAGING | Facility: CLINIC | Age: 62
End: 2017-12-05
Payer: MEDICAID

## 2017-12-05 DIAGNOSIS — Z00.8 ENCOUNTER FOR OTHER GENERAL EXAMINATION: ICD-10-CM

## 2017-12-05 PROCEDURE — 74183 MRI ABD W/O CNTR FLWD CNTR: CPT | Mod: 26

## 2017-12-05 PROCEDURE — 74183 MRI ABD W/O CNTR FLWD CNTR: CPT

## 2017-12-05 PROCEDURE — A9585: CPT

## 2017-12-05 PROCEDURE — 82565 ASSAY OF CREATININE: CPT

## 2017-12-27 ENCOUNTER — RECORD ABSTRACTING (OUTPATIENT)
Age: 62
End: 2017-12-27

## 2017-12-27 DIAGNOSIS — Z87.19 PERSONAL HISTORY OF OTHER DISEASES OF THE DIGESTIVE SYSTEM: ICD-10-CM

## 2017-12-27 DIAGNOSIS — Z63.4 DISAPPEARANCE AND DEATH OF FAMILY MEMBER: ICD-10-CM

## 2017-12-27 DIAGNOSIS — Z87.891 PERSONAL HISTORY OF NICOTINE DEPENDENCE: ICD-10-CM

## 2017-12-27 SDOH — SOCIAL STABILITY - SOCIAL INSECURITY: DISSAPEARANCE AND DEATH OF FAMILY MEMBER: Z63.4

## 2018-01-02 ENCOUNTER — APPOINTMENT (OUTPATIENT)
Dept: HEMATOLOGY ONCOLOGY | Facility: CLINIC | Age: 63
End: 2018-01-02

## 2018-02-26 ENCOUNTER — APPOINTMENT (OUTPATIENT)
Dept: HEMATOLOGY ONCOLOGY | Facility: CLINIC | Age: 63
End: 2018-02-26

## 2018-02-26 DIAGNOSIS — R93.7 ABNORMAL FINDINGS ON DIAGNOSTIC IMAGING OF OTHER PARTS OF MUSCULOSKELETAL SYSTEM: ICD-10-CM

## 2018-11-27 PROBLEM — F10.20 ALCOHOL DEPENDENCE, UNCOMPLICATED: Chronic | Status: ACTIVE | Noted: 2017-08-07

## 2018-12-05 ENCOUNTER — OUTPATIENT (OUTPATIENT)
Dept: OUTPATIENT SERVICES | Facility: HOSPITAL | Age: 63
LOS: 1 days | End: 2018-12-05
Payer: MEDICAID

## 2018-12-05 ENCOUNTER — RESULT REVIEW (OUTPATIENT)
Age: 63
End: 2018-12-05

## 2018-12-05 ENCOUNTER — APPOINTMENT (OUTPATIENT)
Dept: MAMMOGRAPHY | Facility: CLINIC | Age: 63
End: 2018-12-05
Payer: COMMERCIAL

## 2018-12-05 DIAGNOSIS — Z00.8 ENCOUNTER FOR OTHER GENERAL EXAMINATION: ICD-10-CM

## 2018-12-05 PROCEDURE — 77063 BREAST TOMOSYNTHESIS BI: CPT | Mod: 26

## 2018-12-05 PROCEDURE — 77067 SCR MAMMO BI INCL CAD: CPT | Mod: 26

## 2018-12-05 PROCEDURE — 77063 BREAST TOMOSYNTHESIS BI: CPT

## 2018-12-05 PROCEDURE — 77067 SCR MAMMO BI INCL CAD: CPT

## 2019-03-16 ENCOUNTER — TRANSCRIPTION ENCOUNTER (OUTPATIENT)
Age: 64
End: 2019-03-16

## 2019-03-20 ENCOUNTER — TRANSCRIPTION ENCOUNTER (OUTPATIENT)
Age: 64
End: 2019-03-20

## 2019-03-22 ENCOUNTER — TRANSCRIPTION ENCOUNTER (OUTPATIENT)
Age: 64
End: 2019-03-22

## 2019-06-19 ENCOUNTER — APPOINTMENT (OUTPATIENT)
Dept: PHYSICAL MEDICINE AND REHAB | Facility: CLINIC | Age: 64
End: 2019-06-19

## 2019-11-03 ENCOUNTER — TRANSCRIPTION ENCOUNTER (OUTPATIENT)
Age: 64
End: 2019-11-03

## 2020-03-15 ENCOUNTER — TRANSCRIPTION ENCOUNTER (OUTPATIENT)
Age: 65
End: 2020-03-15

## 2020-05-20 ENCOUNTER — APPOINTMENT (OUTPATIENT)
Dept: MAMMOGRAPHY | Facility: CLINIC | Age: 65
End: 2020-05-20

## 2020-06-01 NOTE — DISCHARGE NOTE ADULT - NS AS DC PROVIDER CONTACT Y/N MULTI
----- Message from Sabrina Eason [phone] .     
Yes

## 2020-06-08 ENCOUNTER — RESULT REVIEW (OUTPATIENT)
Age: 65
End: 2020-06-08

## 2020-06-09 ENCOUNTER — APPOINTMENT (OUTPATIENT)
Dept: MAMMOGRAPHY | Facility: CLINIC | Age: 65
End: 2020-06-09
Payer: MEDICARE

## 2020-06-09 ENCOUNTER — OUTPATIENT (OUTPATIENT)
Dept: OUTPATIENT SERVICES | Facility: HOSPITAL | Age: 65
LOS: 1 days | End: 2020-06-09
Payer: COMMERCIAL

## 2020-06-09 DIAGNOSIS — Z00.8 ENCOUNTER FOR OTHER GENERAL EXAMINATION: ICD-10-CM

## 2020-06-09 PROCEDURE — 77067 SCR MAMMO BI INCL CAD: CPT | Mod: 26

## 2020-06-09 PROCEDURE — 77063 BREAST TOMOSYNTHESIS BI: CPT | Mod: 26

## 2020-06-09 PROCEDURE — 77067 SCR MAMMO BI INCL CAD: CPT

## 2020-06-09 PROCEDURE — 77063 BREAST TOMOSYNTHESIS BI: CPT

## 2021-06-22 ENCOUNTER — RESULT REVIEW (OUTPATIENT)
Age: 66
End: 2021-06-22

## 2021-06-22 ENCOUNTER — APPOINTMENT (OUTPATIENT)
Dept: MAMMOGRAPHY | Facility: CLINIC | Age: 66
End: 2021-06-22
Payer: MEDICARE

## 2021-06-22 ENCOUNTER — OUTPATIENT (OUTPATIENT)
Dept: OUTPATIENT SERVICES | Facility: HOSPITAL | Age: 66
LOS: 1 days | End: 2021-06-22
Payer: COMMERCIAL

## 2021-06-22 DIAGNOSIS — Z01.419 ENCOUNTER FOR GYNECOLOGICAL EXAMINATION (GENERAL) (ROUTINE) WITHOUT ABNORMAL FINDINGS: ICD-10-CM

## 2021-06-22 PROCEDURE — 77063 BREAST TOMOSYNTHESIS BI: CPT

## 2021-06-22 PROCEDURE — 77067 SCR MAMMO BI INCL CAD: CPT | Mod: 26

## 2021-06-22 PROCEDURE — 77063 BREAST TOMOSYNTHESIS BI: CPT | Mod: 26

## 2021-06-22 PROCEDURE — 77067 SCR MAMMO BI INCL CAD: CPT

## 2022-06-08 ENCOUNTER — NON-APPOINTMENT (OUTPATIENT)
Age: 67
End: 2022-06-08

## 2022-07-11 ENCOUNTER — APPOINTMENT (OUTPATIENT)
Dept: MAMMOGRAPHY | Facility: CLINIC | Age: 67
End: 2022-07-11

## 2022-07-24 ENCOUNTER — TRANSCRIPTION ENCOUNTER (OUTPATIENT)
Age: 67
End: 2022-07-24

## 2022-08-17 ENCOUNTER — APPOINTMENT (OUTPATIENT)
Dept: MAMMOGRAPHY | Facility: CLINIC | Age: 67
End: 2022-08-17

## 2022-08-17 ENCOUNTER — OUTPATIENT (OUTPATIENT)
Dept: OUTPATIENT SERVICES | Facility: HOSPITAL | Age: 67
LOS: 1 days | End: 2022-08-17
Payer: COMMERCIAL

## 2022-08-17 DIAGNOSIS — Z12.31 ENCOUNTER FOR SCREENING MAMMOGRAM FOR MALIGNANT NEOPLASM OF BREAST: ICD-10-CM

## 2022-08-17 DIAGNOSIS — Z00.8 ENCOUNTER FOR OTHER GENERAL EXAMINATION: ICD-10-CM

## 2022-08-17 PROCEDURE — 77063 BREAST TOMOSYNTHESIS BI: CPT

## 2022-08-17 PROCEDURE — 77063 BREAST TOMOSYNTHESIS BI: CPT | Mod: 26

## 2022-08-17 PROCEDURE — 77067 SCR MAMMO BI INCL CAD: CPT

## 2022-08-17 PROCEDURE — 77067 SCR MAMMO BI INCL CAD: CPT | Mod: 26

## 2022-11-24 ENCOUNTER — NON-APPOINTMENT (OUTPATIENT)
Age: 67
End: 2022-11-24

## 2023-03-20 ENCOUNTER — NON-APPOINTMENT (OUTPATIENT)
Age: 68
End: 2023-03-20

## 2023-05-09 ENCOUNTER — APPOINTMENT (OUTPATIENT)
Dept: OTOLARYNGOLOGY | Facility: CLINIC | Age: 68
End: 2023-05-09
Payer: MEDICARE

## 2023-05-09 VITALS — TEMPERATURE: 96 F | BODY MASS INDEX: 29.23 KG/M2 | WEIGHT: 165 LBS | HEIGHT: 63 IN

## 2023-05-09 DIAGNOSIS — K21.9 GASTRO-ESOPHAGEAL REFLUX DISEASE W/OUT ESOPHAGITIS: ICD-10-CM

## 2023-05-09 DIAGNOSIS — K13.79 OTHER LESIONS OF ORAL MUCOSA: ICD-10-CM

## 2023-05-09 DIAGNOSIS — R59.0 LOCALIZED ENLARGED LYMPH NODES: ICD-10-CM

## 2023-05-09 DIAGNOSIS — J32.0 CHRONIC MAXILLARY SINUSITIS: ICD-10-CM

## 2023-05-09 DIAGNOSIS — G47.33 OBSTRUCTIVE SLEEP APNEA (ADULT) (PEDIATRIC): ICD-10-CM

## 2023-05-09 PROCEDURE — 31231 NASAL ENDOSCOPY DX: CPT

## 2023-05-09 PROCEDURE — 99204 OFFICE O/P NEW MOD 45 MIN: CPT | Mod: 25

## 2023-05-09 RX ORDER — OMEPRAZOLE 40 MG/1
40 CAPSULE, DELAYED RELEASE ORAL
Qty: 90 | Refills: 0 | Status: ACTIVE | COMMUNITY
Start: 2023-05-09 | End: 1900-01-01

## 2023-05-09 NOTE — ASSESSMENT
[FreeTextEntry1] : Patient with concerns about cervical adenopathy with recent strep .  Neck exam minimal findings today but will get sono of neck.\par also has snoring issues, swelling of uvula and occ issues with swallowing - eats spicy foods.  Recommended HST and also will treat for LPR with omeprazole and relfux diet.    No laryngeal lesion seen and no sinusitis

## 2023-05-09 NOTE — HISTORY OF PRESENT ILLNESS
[de-identified] : 2 mos ago treated for sore throat - seen at [facility]gent [facility] and treated for strep - noted swollen uvula -  here for follow up - concern about swollen glands at that time.  No problems breathing or issues with voice.  Hx of PND.   Occ discomfort swallowing.  Hx of lpr - also snores .   Patient does eat a lot of spicy foods

## 2023-05-09 NOTE — REVIEW OF SYSTEMS
[Seasonal Allergies] : seasonal allergies [Post Nasal Drip] : post nasal drip [Negative] : Heme/Lymph [de-identified] : swollen uvula, hard to swallow

## 2023-05-09 NOTE — PHYSICAL EXAM
[Nasal Endoscopy Performed] : nasal endoscopy was performed, see procedure section for findings [] : septum deviated to the left [Midline] : trachea located in midline position [Laryngoscopy Performed] : laryngoscopy was performed, see procedure section for findings [Normal] : no rashes [de-identified] : no palp adeopathy

## 2023-07-06 ENCOUNTER — OUTPATIENT (OUTPATIENT)
Dept: OUTPATIENT SERVICES | Facility: HOSPITAL | Age: 68
LOS: 1 days | End: 2023-07-06
Payer: COMMERCIAL

## 2023-07-06 DIAGNOSIS — G47.33 OBSTRUCTIVE SLEEP APNEA (ADULT) (PEDIATRIC): ICD-10-CM

## 2023-07-06 PROCEDURE — 95800 SLP STDY UNATTENDED: CPT | Mod: 26

## 2023-07-06 PROCEDURE — 95800 SLP STDY UNATTENDED: CPT

## 2023-07-11 ENCOUNTER — NON-APPOINTMENT (OUTPATIENT)
Age: 68
End: 2023-07-11

## 2023-08-09 ENCOUNTER — APPOINTMENT (OUTPATIENT)
Dept: PULMONOLOGY | Facility: CLINIC | Age: 68
End: 2023-08-09

## 2023-09-09 ENCOUNTER — OUTPATIENT (OUTPATIENT)
Dept: OUTPATIENT SERVICES | Facility: HOSPITAL | Age: 68
LOS: 1 days | End: 2023-09-09
Payer: COMMERCIAL

## 2023-09-09 DIAGNOSIS — G47.33 OBSTRUCTIVE SLEEP APNEA (ADULT) (PEDIATRIC): ICD-10-CM

## 2023-09-09 PROCEDURE — 95811 POLYSOM 6/>YRS CPAP 4/> PARM: CPT

## 2023-09-09 PROCEDURE — 95811 POLYSOM 6/>YRS CPAP 4/> PARM: CPT | Mod: 26

## 2023-09-13 ENCOUNTER — NON-APPOINTMENT (OUTPATIENT)
Age: 68
End: 2023-09-13

## 2023-09-27 ENCOUNTER — APPOINTMENT (OUTPATIENT)
Dept: OTOLARYNGOLOGY | Facility: CLINIC | Age: 68
End: 2023-09-27

## 2024-05-19 ENCOUNTER — RESULT REVIEW (OUTPATIENT)
Age: 69
End: 2024-05-19

## 2024-05-20 ENCOUNTER — OUTPATIENT (OUTPATIENT)
Dept: OUTPATIENT SERVICES | Facility: HOSPITAL | Age: 69
LOS: 1 days | End: 2024-05-20
Payer: MEDICARE

## 2024-05-20 ENCOUNTER — APPOINTMENT (OUTPATIENT)
Dept: MAMMOGRAPHY | Facility: CLINIC | Age: 69
End: 2024-05-20
Payer: MEDICARE

## 2024-05-20 ENCOUNTER — RESULT REVIEW (OUTPATIENT)
Age: 69
End: 2024-05-20

## 2024-05-20 DIAGNOSIS — Z01.419 ENCOUNTER FOR GYNECOLOGICAL EXAMINATION (GENERAL) (ROUTINE) WITHOUT ABNORMAL FINDINGS: ICD-10-CM

## 2024-05-20 PROCEDURE — 77067 SCR MAMMO BI INCL CAD: CPT

## 2024-05-20 PROCEDURE — 77067 SCR MAMMO BI INCL CAD: CPT | Mod: 26

## 2024-05-20 PROCEDURE — 77063 BREAST TOMOSYNTHESIS BI: CPT

## 2024-05-20 PROCEDURE — 77063 BREAST TOMOSYNTHESIS BI: CPT | Mod: 26

## 2025-05-06 NOTE — PROGRESS NOTE ADULT - SUBJECTIVE AND OBJECTIVE BOX
May 6, 2025     Patient: Tanesha Bynum  YOB: 2017  Date of Visit: 5/6/2025      To Whom it May Concern:    Tanesha Bynum is under my professional care. Tanesha was seen in my office on 5/6/2025. Tanesha may return to gym class or sports on 5/6/25 , please allow her to participate as tolerated. Please excuse her from school 5/5/25 and 5/6/25.    If you have any questions or concerns, please don't hesitate to call.         Sincerely,          Edvin Ann, DO        CC: No Recipients   Patient is a 62y old  Female who presents with a chief complaint of Abd pain (07 Aug 2017 14:42)      HPI:  62F with Hx of PUD, Etoh dependence and Pancreatitis in the past p/w abd pain x5 days, worse this weekend. pt states abd pain is mostly in the upper abd, worse in the left side. denies any vomiting, diarrhea. (+) nausea. no fever, chills, dysuria. pain much worse this weekend. pt usually drinks 2-3 vodka drinks daily, last weekend had more to drink however. last drink saturday night. denies any hx of seizure in the past, no hx of detox prior. (07 Aug 2017 14:42)    pt with cont but improved upper abd mild pain s radiation, mild N, inc pain with thinking about food  pos flatus        PAST MEDICAL & SURGICAL HISTORY:  Alcohol dependence  Pancreatitis  PUD (peptic ulcer disease)  No significant past surgical history      MEDICATIONS  (STANDING):  enoxaparin Injectable 40 milliGRAM(s) SubCutaneous every 24 hours  pantoprazole  Injectable 40 milliGRAM(s) IV Push daily  dextrose 5% + sodium chloride 0.9% 1000 milliLiter(s) (125 mL/Hr) IV Continuous <Continuous>    MEDICATIONS  (PRN):  ondansetron Injectable 4 milliGRAM(s) IV Push every 6 hours PRN Nausea  HYDROmorphone  Injectable 0.5 milliGRAM(s) IV Push every 4 hours PRN Moderate Pain (4 - 6)  HYDROmorphone  Injectable 1 milliGRAM(s) IV Push every 4 hours PRN Severe Pain (7 - 10)  acetaminophen   Tablet 650 milliGRAM(s) Oral every 6 hours PRN For Temp greater than 38.5 C (101.3 F)      Allergies    No Known Allergies    Intolerances        SOCIAL HISTORY:NC    FAMILY HISTORY:NC      REVIEW OF SYSTEMS:    CONSTITUTIONAL: No weakness, fevers or chills  EYES/ENT: No visual changes;  No vertigo or throat pain   NECK: No pain or stiffness  RESPIRATORY: No cough, wheezing, hemoptysis; No shortness of breath  CARDIOVASCULAR: No chest pain or palpitations  GENITOURINARY: No dysuria, frequency or hematuria  NEUROLOGICAL: No numbness or weakness  SKIN: No itching, burning, rashes, or lesions   All other review of systems is negative unless indicated above.    Vital Signs Last 24 Hrs  T(C): 37.8 (13 Aug 2017 11:32), Max: 37.8 (13 Aug 2017 11:32)  T(F): 100.1 (13 Aug 2017 11:32), Max: 100.1 (13 Aug 2017 11:32)  HR: 80 (13 Aug 2017 11:32) (75 - 86)  BP: 128/76 (13 Aug 2017 11:32) (111/62 - 128/76)  BP(mean): --  RR: 18 (13 Aug 2017 11:32) (18 - 18)  SpO2: 98% (13 Aug 2017 11:32) (96% - 99%)    PHYSICAL EXAM:    Constitutional: NAD, well-developed  HEENT: EOMI, throat clear  Neck: No LAD, supple  Respiratory: CTA and P  Cardiovascular: S1 and S2, RRR, no M  Gastrointestinal: BS+, soft, NT/ND, neg HSM,  Extremities: No peripheral edema, neg clubing, cyanosis  Vascular: 2+ peripheral pulses  Neurological: A/O x 3, no focal deficits  Psychiatric: Normal mood, normal affect  Skin: No rashes    LABS: lipase reviewed                  RADIOLOGY & ADDITIONAL STUDIES:  < from: Xray Chest 1 View AP/PA. (08.11.17 @ 09:44) >  EXAM:  CHEST SINGLE VIEW FRONTAL                            PROCEDURE DATE:  08/11/2017          INTERPRETATION:  Portable chest radiograph dated 8/11/2017.    COMPARISON: 8/7/2017.    CLINICAL INFORMATION: Fever.    FINDINGS:    The airway is midline.  There are no airspace consolidations.  There is no pleural effusion or pneumothorax.   The cardiac silhouette is normal size.   The bones are normal.     IMPRESSION:    No acute cardiopulmonary findings.    < end of copied text >